# Patient Record
Sex: FEMALE | Race: BLACK OR AFRICAN AMERICAN | NOT HISPANIC OR LATINO | Employment: OTHER | URBAN - METROPOLITAN AREA
[De-identification: names, ages, dates, MRNs, and addresses within clinical notes are randomized per-mention and may not be internally consistent; named-entity substitution may affect disease eponyms.]

---

## 2023-10-24 ENCOUNTER — HOSPITAL ENCOUNTER (OUTPATIENT)
Facility: HOSPITAL | Age: 43
Discharge: HOME | End: 2023-10-26
Attending: EMERGENCY MEDICINE | Admitting: EMERGENCY MEDICINE
Payer: MEDICAID

## 2023-10-24 DIAGNOSIS — E66.01 MORBID OBESITY: ICD-10-CM

## 2023-10-24 DIAGNOSIS — R11.2 NAUSEA AND VOMITING, UNSPECIFIED VOMITING TYPE: ICD-10-CM

## 2023-10-24 DIAGNOSIS — R10.11 RIGHT UPPER QUADRANT ABDOMINAL PAIN: ICD-10-CM

## 2023-10-24 DIAGNOSIS — I50.9 CHF (CONGESTIVE HEART FAILURE): ICD-10-CM

## 2023-10-24 DIAGNOSIS — I05.9 MITRAL VALVE DISEASE: ICD-10-CM

## 2023-10-24 DIAGNOSIS — R06.02 SOB (SHORTNESS OF BREATH): ICD-10-CM

## 2023-10-24 DIAGNOSIS — I10 PRIMARY HYPERTENSION: ICD-10-CM

## 2023-10-24 DIAGNOSIS — K80.00 CALCULUS OF GALLBLADDER WITH ACUTE CHOLECYSTITIS WITHOUT OBSTRUCTION: ICD-10-CM

## 2023-10-24 DIAGNOSIS — K80.20 MULTIPLE GALLSTONES: Primary | ICD-10-CM

## 2023-10-24 LAB
ALBUMIN SERPL BCP-MCNC: 3.8 G/DL (ref 3.5–5.2)
ALP SERPL-CCNC: 73 U/L (ref 55–135)
ALT SERPL W/O P-5'-P-CCNC: 12 U/L (ref 10–44)
ANION GAP SERPL CALC-SCNC: 11 MMOL/L (ref 8–16)
AST SERPL-CCNC: 13 U/L (ref 10–40)
B-HCG UR QL: NEGATIVE
BACTERIA #/AREA URNS HPF: NORMAL /HPF
BASOPHILS # BLD AUTO: 0.05 K/UL (ref 0–0.2)
BASOPHILS NFR BLD: 0.5 % (ref 0–1.9)
BILIRUB SERPL-MCNC: 0.3 MG/DL (ref 0.1–1)
BILIRUB UR QL STRIP: NEGATIVE
BUN SERPL-MCNC: 8 MG/DL (ref 6–20)
CALCIUM SERPL-MCNC: 8.8 MG/DL (ref 8.7–10.5)
CHLORIDE SERPL-SCNC: 104 MMOL/L (ref 95–110)
CLARITY UR: ABNORMAL
CO2 SERPL-SCNC: 22 MMOL/L (ref 23–29)
COLOR UR: YELLOW
CREAT SERPL-MCNC: 1.2 MG/DL (ref 0.5–1.4)
DIFFERENTIAL METHOD: ABNORMAL
EOSINOPHIL # BLD AUTO: 0.1 K/UL (ref 0–0.5)
EOSINOPHIL NFR BLD: 1.2 % (ref 0–8)
ERYTHROCYTE [DISTWIDTH] IN BLOOD BY AUTOMATED COUNT: 19.8 % (ref 11.5–14.5)
EST. GFR  (NO RACE VARIABLE): 57 ML/MIN/1.73 M^2
GLUCOSE SERPL-MCNC: 125 MG/DL (ref 70–110)
GLUCOSE UR QL STRIP: NEGATIVE
HCT VFR BLD AUTO: 37.7 % (ref 37–48.5)
HGB BLD-MCNC: 11 G/DL (ref 12–16)
HGB UR QL STRIP: NEGATIVE
HYALINE CASTS #/AREA URNS LPF: 1 /LPF
IMM GRANULOCYTES # BLD AUTO: 0.05 K/UL (ref 0–0.04)
IMM GRANULOCYTES NFR BLD AUTO: 0.5 % (ref 0–0.5)
KETONES UR QL STRIP: NEGATIVE
LEUKOCYTE ESTERASE UR QL STRIP: NEGATIVE
LIPASE SERPL-CCNC: 14 U/L (ref 4–60)
LYMPHOCYTES # BLD AUTO: 1.9 K/UL (ref 1–4.8)
LYMPHOCYTES NFR BLD: 17.2 % (ref 18–48)
MCH RBC QN AUTO: 21.7 PG (ref 27–31)
MCHC RBC AUTO-ENTMCNC: 29.2 G/DL (ref 32–36)
MCV RBC AUTO: 75 FL (ref 82–98)
MICROSCOPIC COMMENT: NORMAL
MONOCYTES # BLD AUTO: 0.5 K/UL (ref 0.3–1)
MONOCYTES NFR BLD: 4.9 % (ref 4–15)
NEUTROPHILS # BLD AUTO: 8.3 K/UL (ref 1.8–7.7)
NEUTROPHILS NFR BLD: 75.7 % (ref 38–73)
NITRITE UR QL STRIP: NEGATIVE
NRBC BLD-RTO: 0 /100 WBC
PH UR STRIP: 7 [PH] (ref 5–8)
PLATELET # BLD AUTO: 374 K/UL (ref 150–450)
PMV BLD AUTO: 9.1 FL (ref 9.2–12.9)
POCT GLUCOSE: 103 MG/DL (ref 70–110)
POCT GLUCOSE: 127 MG/DL (ref 70–110)
POTASSIUM SERPL-SCNC: 4.6 MMOL/L (ref 3.5–5.1)
PROT SERPL-MCNC: 8.8 G/DL (ref 6–8.4)
PROT UR QL STRIP: ABNORMAL
RBC # BLD AUTO: 5.06 M/UL (ref 4–5.4)
RBC #/AREA URNS HPF: 1 /HPF (ref 0–4)
SODIUM SERPL-SCNC: 137 MMOL/L (ref 136–145)
SP GR UR STRIP: 1.02 (ref 1–1.03)
SQUAMOUS #/AREA URNS HPF: 24 /HPF
URN SPEC COLLECT METH UR: ABNORMAL
UROBILINOGEN UR STRIP-ACNC: NEGATIVE EU/DL
WBC # BLD AUTO: 10.98 K/UL (ref 3.9–12.7)
WBC #/AREA URNS HPF: 2 /HPF (ref 0–5)

## 2023-10-24 PROCEDURE — 80053 COMPREHEN METABOLIC PANEL: CPT | Performed by: NURSE PRACTITIONER

## 2023-10-24 PROCEDURE — 63600175 PHARM REV CODE 636 W HCPCS: Performed by: EMERGENCY MEDICINE

## 2023-10-24 PROCEDURE — 99285 EMERGENCY DEPT VISIT HI MDM: CPT | Mod: 25

## 2023-10-24 PROCEDURE — 83690 ASSAY OF LIPASE: CPT | Performed by: NURSE PRACTITIONER

## 2023-10-24 PROCEDURE — 90677 PCV20 VACCINE IM: CPT | Performed by: EMERGENCY MEDICINE

## 2023-10-24 PROCEDURE — 81000 URINALYSIS NONAUTO W/SCOPE: CPT | Performed by: NURSE PRACTITIONER

## 2023-10-24 PROCEDURE — 99204 OFFICE O/P NEW MOD 45 MIN: CPT | Mod: ,,, | Performed by: STUDENT IN AN ORGANIZED HEALTH CARE EDUCATION/TRAINING PROGRAM

## 2023-10-24 PROCEDURE — 25000003 PHARM REV CODE 250: Performed by: NURSE PRACTITIONER

## 2023-10-24 PROCEDURE — G0378 HOSPITAL OBSERVATION PER HR: HCPCS

## 2023-10-24 PROCEDURE — 25000003 PHARM REV CODE 250: Performed by: EMERGENCY MEDICINE

## 2023-10-24 PROCEDURE — 81025 URINE PREGNANCY TEST: CPT | Performed by: NURSE PRACTITIONER

## 2023-10-24 PROCEDURE — 96376 TX/PRO/DX INJ SAME DRUG ADON: CPT

## 2023-10-24 PROCEDURE — 96365 THER/PROPH/DIAG IV INF INIT: CPT | Mod: 59

## 2023-10-24 PROCEDURE — 90686 IIV4 VACC NO PRSV 0.5 ML IM: CPT | Performed by: EMERGENCY MEDICINE

## 2023-10-24 PROCEDURE — 85025 COMPLETE CBC W/AUTO DIFF WBC: CPT | Performed by: NURSE PRACTITIONER

## 2023-10-24 PROCEDURE — 90472 IMMUNIZATION ADMIN EACH ADD: CPT | Performed by: EMERGENCY MEDICINE

## 2023-10-24 PROCEDURE — 90471 IMMUNIZATION ADMIN: CPT | Performed by: EMERGENCY MEDICINE

## 2023-10-24 PROCEDURE — 96376 TX/PRO/DX INJ SAME DRUG ADON: CPT | Mod: 59

## 2023-10-24 PROCEDURE — 96361 HYDRATE IV INFUSION ADD-ON: CPT

## 2023-10-24 PROCEDURE — 99204 PR OFFICE/OUTPT VISIT, NEW, LEVL IV, 45-59 MIN: ICD-10-PCS | Mod: ,,, | Performed by: STUDENT IN AN ORGANIZED HEALTH CARE EDUCATION/TRAINING PROGRAM

## 2023-10-24 PROCEDURE — 63600175 PHARM REV CODE 636 W HCPCS: Performed by: STUDENT IN AN ORGANIZED HEALTH CARE EDUCATION/TRAINING PROGRAM

## 2023-10-24 PROCEDURE — 25000003 PHARM REV CODE 250: Performed by: STUDENT IN AN ORGANIZED HEALTH CARE EDUCATION/TRAINING PROGRAM

## 2023-10-24 PROCEDURE — 96372 THER/PROPH/DIAG INJ SC/IM: CPT | Mod: 59 | Performed by: STUDENT IN AN ORGANIZED HEALTH CARE EDUCATION/TRAINING PROGRAM

## 2023-10-24 PROCEDURE — 96375 TX/PRO/DX INJ NEW DRUG ADDON: CPT | Mod: 59

## 2023-10-24 RX ORDER — ONDANSETRON 2 MG/ML
4 INJECTION INTRAMUSCULAR; INTRAVENOUS EVERY 8 HOURS PRN
Status: DISCONTINUED | OUTPATIENT
Start: 2023-10-24 | End: 2023-10-26 | Stop reason: HOSPADM

## 2023-10-24 RX ORDER — PROMETHAZINE HYDROCHLORIDE 25 MG/1
25 TABLET ORAL EVERY 6 HOURS PRN
Status: DISCONTINUED | OUTPATIENT
Start: 2023-10-24 | End: 2023-10-26 | Stop reason: HOSPADM

## 2023-10-24 RX ORDER — CARVEDILOL 12.5 MG/1
25 TABLET ORAL 2 TIMES DAILY
Status: DISCONTINUED | OUTPATIENT
Start: 2023-10-24 | End: 2023-10-26 | Stop reason: HOSPADM

## 2023-10-24 RX ORDER — ENOXAPARIN SODIUM 100 MG/ML
40 INJECTION SUBCUTANEOUS EVERY 24 HOURS
Status: DISCONTINUED | OUTPATIENT
Start: 2023-10-24 | End: 2023-10-24

## 2023-10-24 RX ORDER — HYDROMORPHONE HYDROCHLORIDE 2 MG/ML
1 INJECTION, SOLUTION INTRAMUSCULAR; INTRAVENOUS; SUBCUTANEOUS
Status: COMPLETED | OUTPATIENT
Start: 2023-10-24 | End: 2023-10-24

## 2023-10-24 RX ORDER — ACETAMINOPHEN 325 MG/1
650 TABLET ORAL EVERY 4 HOURS PRN
Status: DISCONTINUED | OUTPATIENT
Start: 2023-10-24 | End: 2023-10-26 | Stop reason: HOSPADM

## 2023-10-24 RX ORDER — INSULIN ASPART 100 [IU]/ML
0-5 INJECTION, SOLUTION INTRAVENOUS; SUBCUTANEOUS
Status: DISCONTINUED | OUTPATIENT
Start: 2023-10-24 | End: 2023-10-26 | Stop reason: HOSPADM

## 2023-10-24 RX ORDER — SODIUM CHLORIDE 0.9 % (FLUSH) 0.9 %
10 SYRINGE (ML) INJECTION EVERY 12 HOURS PRN
Status: DISCONTINUED | OUTPATIENT
Start: 2023-10-24 | End: 2023-10-26 | Stop reason: HOSPADM

## 2023-10-24 RX ORDER — HYDROMORPHONE HYDROCHLORIDE 1 MG/ML
0.2 INJECTION, SOLUTION INTRAMUSCULAR; INTRAVENOUS; SUBCUTANEOUS EVERY 6 HOURS PRN
Status: DISCONTINUED | OUTPATIENT
Start: 2023-10-24 | End: 2023-10-26 | Stop reason: HOSPADM

## 2023-10-24 RX ORDER — GLUCAGON 1 MG
1 KIT INJECTION
Status: DISCONTINUED | OUTPATIENT
Start: 2023-10-24 | End: 2023-10-26 | Stop reason: HOSPADM

## 2023-10-24 RX ORDER — ASPIRIN 81 MG/1
1 TABLET ORAL DAILY
COMMUNITY

## 2023-10-24 RX ORDER — FUROSEMIDE 40 MG/1
40 TABLET ORAL DAILY
Status: DISCONTINUED | OUTPATIENT
Start: 2023-10-25 | End: 2023-10-26 | Stop reason: HOSPADM

## 2023-10-24 RX ORDER — SACUBITRIL AND VALSARTAN 49; 51 MG/1; MG/1
1 TABLET, FILM COATED ORAL 2 TIMES DAILY
COMMUNITY
Start: 2023-09-28

## 2023-10-24 RX ORDER — MORPHINE SULFATE 4 MG/ML
4 INJECTION, SOLUTION INTRAMUSCULAR; INTRAVENOUS
Status: COMPLETED | OUTPATIENT
Start: 2023-10-24 | End: 2023-10-24

## 2023-10-24 RX ORDER — ONDANSETRON 2 MG/ML
4 INJECTION INTRAMUSCULAR; INTRAVENOUS
Status: COMPLETED | OUTPATIENT
Start: 2023-10-24 | End: 2023-10-24

## 2023-10-24 RX ORDER — IBUPROFEN 200 MG
24 TABLET ORAL
Status: DISCONTINUED | OUTPATIENT
Start: 2023-10-24 | End: 2023-10-26 | Stop reason: HOSPADM

## 2023-10-24 RX ORDER — HYDROMORPHONE HYDROCHLORIDE 2 MG/ML
0.2 INJECTION, SOLUTION INTRAMUSCULAR; INTRAVENOUS; SUBCUTANEOUS EVERY 6 HOURS PRN
Status: DISCONTINUED | OUTPATIENT
Start: 2023-10-24 | End: 2023-10-24 | Stop reason: CLARIF

## 2023-10-24 RX ORDER — NALOXONE HCL 0.4 MG/ML
0.02 VIAL (ML) INJECTION
Status: DISCONTINUED | OUTPATIENT
Start: 2023-10-24 | End: 2023-10-26 | Stop reason: HOSPADM

## 2023-10-24 RX ORDER — IBUPROFEN 200 MG
16 TABLET ORAL
Status: DISCONTINUED | OUTPATIENT
Start: 2023-10-24 | End: 2023-10-26 | Stop reason: HOSPADM

## 2023-10-24 RX ORDER — OXYCODONE HYDROCHLORIDE 5 MG/1
5 TABLET ORAL EVERY 6 HOURS PRN
Status: DISCONTINUED | OUTPATIENT
Start: 2023-10-24 | End: 2023-10-25

## 2023-10-24 RX ORDER — SODIUM CHLORIDE 9 MG/ML
INJECTION, SOLUTION INTRAVENOUS
Status: DISCONTINUED | OUTPATIENT
Start: 2023-10-24 | End: 2023-10-25

## 2023-10-24 RX ORDER — ENOXAPARIN SODIUM 100 MG/ML
60 INJECTION SUBCUTANEOUS EVERY 12 HOURS
Status: DISCONTINUED | OUTPATIENT
Start: 2023-10-24 | End: 2023-10-25

## 2023-10-24 RX ADMIN — HYDROMORPHONE HYDROCHLORIDE 1 MG: 2 INJECTION, SOLUTION INTRAMUSCULAR; INTRAVENOUS; SUBCUTANEOUS at 02:10

## 2023-10-24 RX ADMIN — HYDROMORPHONE HYDROCHLORIDE 0.2 MG: 1 INJECTION, SOLUTION INTRAMUSCULAR; INTRAVENOUS; SUBCUTANEOUS at 08:10

## 2023-10-24 RX ADMIN — ONDANSETRON 4 MG: 2 INJECTION INTRAMUSCULAR; INTRAVENOUS at 06:10

## 2023-10-24 RX ADMIN — SODIUM CHLORIDE: 9 INJECTION, SOLUTION INTRAVENOUS at 04:10

## 2023-10-24 RX ADMIN — OXYCODONE HYDROCHLORIDE 5 MG: 5 TABLET ORAL at 06:10

## 2023-10-24 RX ADMIN — MORPHINE SULFATE 4 MG: 4 INJECTION INTRAVENOUS at 12:10

## 2023-10-24 RX ADMIN — INFLUENZA VIRUS VACCINE 0.5 ML: 15; 15; 15; 15 SUSPENSION INTRAMUSCULAR at 04:10

## 2023-10-24 RX ADMIN — ONDANSETRON 4 MG: 2 INJECTION INTRAMUSCULAR; INTRAVENOUS at 12:10

## 2023-10-24 RX ADMIN — PIPERACILLIN SODIUM AND TAZOBACTAM SODIUM 4.5 G: 4; .5 INJECTION, POWDER, FOR SOLUTION INTRAVENOUS at 04:10

## 2023-10-24 RX ADMIN — CARVEDILOL 25 MG: 12.5 TABLET, FILM COATED ORAL at 08:10

## 2023-10-24 RX ADMIN — ENOXAPARIN SODIUM 60 MG: 60 INJECTION SUBCUTANEOUS at 08:10

## 2023-10-24 RX ADMIN — PNEUMOCOCCAL 20-VALENT CONJUGATE VACCINE 0.5 ML
2.2; 2.2; 2.2; 2.2; 2.2; 2.2; 2.2; 2.2; 2.2; 2.2; 2.2; 2.2; 2.2; 2.2; 2.2; 2.2; 4.4; 2.2; 2.2; 2.2 INJECTION, SUSPENSION INTRAMUSCULAR at 04:10

## 2023-10-24 RX ADMIN — SACUBITRIL AND VALSARTAN 1 TABLET: 49; 51 TABLET, FILM COATED ORAL at 08:10

## 2023-10-24 RX ADMIN — HYDROMORPHONE HYDROCHLORIDE 1 MG: 2 INJECTION, SOLUTION INTRAMUSCULAR; INTRAVENOUS; SUBCUTANEOUS at 01:10

## 2023-10-24 NOTE — ASSESSMENT & PLAN NOTE
-patient says she has a mitral valve tear, she takes Entresto and was told that it would repair it medically  -f/u OP

## 2023-10-24 NOTE — ASSESSMENT & PLAN NOTE
"Patient is identified as having Systolic (HFrEF) heart failure that is Chronic. CHF is currently controlled. Latest ECHO performed and demonstrates- No results found for this or any previous visit.  . Continue ACE/ARB and Furosemide and monitor clinical status closely. Monitor on telemetry. Patient is off CHF pathway.  Monitor strict Is&Os and daily weights.  Place on fluid restriction of 1.5 L. Cardiology has been consulted. Continue to stress to patient importance of self efficacy and  on diet for CHF. Last BNP reviewed- and noted below No results for input(s): "BNP", "BNPTRIAGEBLO" in the last 168 hours.  "

## 2023-10-24 NOTE — PHARMACY MED REC
"Admission Medication History     The home medication history was taken by Elina Holcomb.    You may go to "Admission" then "Reconcile Home Medications" tabs to review and/or act upon these items.     The home medication list has been updated by the Pharmacy department.   Please read ALL comments highlighted in yellow.   Please address this information as you see fit.    Feel free to contact us if you have any questions or require assistance.      The medications listed below were removed from the home medication list. Please reorder if appropriate:  Patient reports no longer taking the following medication(s):  Prinivil 20mg  Phenergan 12.5mg      Medications listed below were obtained from: Patient/family and Analytic software- Antonio,family member in room helped with med list  (Not in a hospital admission)        Elina Holcomb  [id number]      Current Outpatient Medications on File Prior to Encounter   Medication Sig Dispense Refill Last Dose    aspirin (ECOTRIN) 81 MG EC tablet Take 1 tablet by mouth once daily.   10/24/2023    atorvastatin (LIPITOR) 20 MG tablet TK 1 T PO ONCE A DAY AT NIGHT  11 10/24/2023    carvedilol (COREG) 25 MG tablet TK 1 T PO  BID  11 10/24/2023    ENTRESTO 49-51 mg per tablet Take 1 tablet by mouth 2 (two) times daily.   10/24/2023    furosemide (LASIX) 40 MG tablet TK 1 T PO QD  8 10/24/2023                           .          "

## 2023-10-24 NOTE — ASSESSMENT & PLAN NOTE
Chronic, controlled  -Latest blood pressure and vitals reviewed-   Temp:  [97.9 °F (36.6 °C)-98.4 °F (36.9 °C)]   Pulse:  [72-77]   Resp:  [15-20]   BP: (134-165)/(62-82)   SpO2:  [97 %-100 %] .   -Home meds for hypertension were reviewed and noted below.   Hypertension Medications             carvedilol (COREG) 25 MG tablet TK 1 T PO  BID    ENTRESTO 49-51 mg per tablet Take 1 tablet by mouth 2 (two) times daily.    furosemide (LASIX) 40 MG tablet TK 1 T PO QD        -While in the hospital, will manage blood pressure as follows; Continue home antihypertensive regimen  -optimize pain management

## 2023-10-24 NOTE — HPI
44 y/o female with PMH of HTN, gallstones, CHF, mitral valve tear, and morbid obesity presented to ER 10/24/23 with c/o RUQ pain that radiates across her back on the right side with associated nausea and vomiting. She reports she has been having pain and gallstones since 2011, she has seen surgeon before and was told it wasn't bad enough for her to have it removed. She reports having attacks approximately 2 times a year and describes it as debilitating for her. She reports she does not eat fried, fatty, or red meat often; however, she had a burger last night that likely triggered her attack.  In ER, labs: H&H 11.0/37.7, CO 2 22, glucose 125. UA with +2 protein, preg test negative. CXR no acute finding. US abdomen with multiple gallstones, no gallbladder wall thickening or tenderness. Common bile duct is borderline prominent measuring 6.1 mm. Afebrile on admission. General surgery consulted and [place] [facility] [place] for cholecystitis.

## 2023-10-24 NOTE — ASSESSMENT & PLAN NOTE
-US shows multiple gallstones, no gallbladder wall thickening or tenderness, common bile duct is borderline prominent measuring 6.1 mm.   -general surgery consulted, plan for robotic oj tomorrow  -cont Zosyn IV  -IVF hydration  -start full liquid diet, NPO after midnight  -lipase 14  -pain control with IV/PO regimen  -anti-emetics PRN

## 2023-10-24 NOTE — ED PROVIDER NOTES
SCRIBE #1 NOTE: I, Marisol Pichardo, am scribing for, and in the presence of, Dulce Maria Morocho MD. I have scribed the entire note.       History     Chief Complaint   Patient presents with    Abdominal Pain     RUQ abdominal pain since last night with n/v heart burn. Pt states throwing up gold colored emesis. Pt states hx of gallbladder issues.      Review of patient's allergies indicates:  No Known Allergies      History of Present Illness     HPI    10/24/2023, 12:31 PM  History obtained from the patient      History of Present Illness: Vanessa Trammell is a 43 y.o. female patient with a PMHx of HTN, CHF, cholecystitis, torn mitral valve and is currently on lasiks who presents to the Emergency Department for evaluation of RUQ abdominal pain. Pt states that her entire right side hurts including her lower right back and top back of right shoulder. She states that the pain began around 10:30 last night. Pt has a history of gallstones and states that the pain is in similar locations than it was when she had them, but it feels much worse this time. Pt states that the last time imaging was done on her gallbladder was about a year ago. She also states that she followed up with a surgeon at [facility] in regards to the issue, but did not need surgery. Symptoms are constant and moderate in severity. Pain is exacerbated by movement and relived a little by applying pressure to affected areas. Pt reports that she has vomited bile 3 times and that the vomit has been gold in color. Other associated sxs include fevers and chills. Patient denies any diaphoresis, diarrhea, SOB, CP, numbness and all other sxs at this time. No prior Tx mentioned. No further complaints or concerns at this time.       Arrival mode: Personal vehicle     PCP: Miles Pritchard MD        Past Medical History:  Past Medical History:   Diagnosis Date    CHF (congestive heart failure)     Gallstones     Hypertension        Past Surgical History:  Past Surgical  History:   Procedure Laterality Date     SECTION      TUBAL LIGATION           Family History:  No family history on file.    Social History:  Social History     Tobacco Use    Smoking status: Every Day    Smokeless tobacco: Not on file   Substance and Sexual Activity    Alcohol use: No    Drug use: No    Sexual activity: Not on file        Review of Systems     Review of Systems   Constitutional:  Positive for chills and fever. Negative for diaphoresis.   HENT:  Negative for sore throat.    Respiratory:  Negative for shortness of breath.    Cardiovascular:  Negative for chest pain.   Gastrointestinal:  Positive for abdominal pain (RUQ), nausea and vomiting (vomiting bile). Negative for diarrhea.   Genitourinary:  Negative for dysuria.   Musculoskeletal:  Positive for arthralgias (back of top right shoulder), back pain (lower right back) and myalgias (right side of body).   Skin:  Negative for rash.   Neurological:  Negative for weakness and numbness.   Hematological:  Does not bruise/bleed easily.   All other systems reviewed and are negative.     Physical Exam     Initial Vitals [10/24/23 1110]   BP Pulse Resp Temp SpO2   (!) 146/63 77 20 98.4 °F (36.9 °C) 98 %      MAP       --          Physical Exam  Nursing Notes and Vital Signs Reviewed.  Constitutional: Patient is in no acute distress. Obese.  Head: Atraumatic. Normocephalic.  Eyes: PERRL. EOM intact. Conjunctivae are not pale. No scleral icterus.  ENT: Mucous membranes are moist. Oropharynx is clear and symmetric.    Neck: Supple. Full ROM. No lymphadenopathy.  Cardiovascular: Regular rate. Regular rhythm. No murmurs, rubs, or gallops. Distal pulses are 2+ and symmetric.  Pulmonary/Chest: No respiratory distress. Clear to auscultation bilaterally. No wheezing or rales.  Abdominal: Soft and non-distended.  There is RUQ tenderness.  No rebound, guarding, or rigidity.   Musculoskeletal: Moves all extremities. No obvious deformities. No edema.  Skin:  "Warm and dry.  Neurological:  Alert, awake, and appropriate.  Normal speech.  No acute focal neurological deficits are appreciated.  Psychiatric: Normal affect. Good eye contact. Appropriate in content.     ED Course   Procedures  ED Vital Signs:  Vitals:    10/24/23 1110 10/24/23 1132 10/24/23 1133 10/24/23 1208   BP: (!) 146/63 135/62     Pulse: 77 76 75    Resp: 20 17  18   Temp: 98.4 °F (36.9 °C)      TempSrc: Oral      SpO2: 98% 100%     Weight: (!) 141.4 kg (311 lb 13.5 oz)      Height: 5' 3" (1.6 m)       10/24/23 1238 10/24/23 1302 10/24/23 1324 10/24/23 1332   BP: 134/76 (!) 164/75 (!) 145/65 (!) 145/66   Pulse: 77 73 75 74   Resp: 16 20 15 16   Temp: 97.9 °F (36.6 °C)      TempSrc:       SpO2:  99% 100% 97%   Weight:       Height:        10/24/23 1355 10/24/23 1429   BP: (!) 165/67    Pulse: 72    Resp: 15 16   Temp: 98.3 °F (36.8 °C)    TempSrc: Oral    SpO2: 100%    Weight:     Height:         Abnormal Lab Results:  Labs Reviewed   CBC W/ AUTO DIFFERENTIAL - Abnormal; Notable for the following components:       Result Value    Hemoglobin 11.0 (*)     MCV 75 (*)     MCH 21.7 (*)     MCHC 29.2 (*)     RDW 19.8 (*)     MPV 9.1 (*)     Gran # (ANC) 8.3 (*)     Immature Grans (Abs) 0.05 (*)     Gran % 75.7 (*)     Lymph % 17.2 (*)     All other components within normal limits   COMPREHENSIVE METABOLIC PANEL - Abnormal; Notable for the following components:    CO2 22 (*)     Glucose 125 (*)     Total Protein 8.8 (*)     eGFR 57 (*)     All other components within normal limits   URINALYSIS, REFLEX TO URINE CULTURE - Abnormal; Notable for the following components:    Appearance, UA Hazy (*)     Protein, UA 2+ (*)     All other components within normal limits    Narrative:     Specimen Source->Urine   LIPASE   PREGNANCY TEST, URINE RAPID    Narrative:     Specimen Source->Urine   URINALYSIS MICROSCOPIC    Narrative:     Specimen Source->Urine        All Lab Results:  Results for orders placed or performed during " the hospital encounter of 10/24/23   CBC auto differential   Result Value Ref Range    WBC 10.98 3.90 - 12.70 K/uL    RBC 5.06 4.00 - 5.40 M/uL    Hemoglobin 11.0 (L) 12.0 - 16.0 g/dL    Hematocrit 37.7 37.0 - 48.5 %    MCV 75 (L) 82 - 98 fL    MCH 21.7 (L) 27.0 - 31.0 pg    MCHC 29.2 (L) 32.0 - 36.0 g/dL    RDW 19.8 (H) 11.5 - 14.5 %    Platelets 374 150 - 450 K/uL    MPV 9.1 (L) 9.2 - 12.9 fL    Immature Granulocytes 0.5 0.0 - 0.5 %    Gran # (ANC) 8.3 (H) 1.8 - 7.7 K/uL    Immature Grans (Abs) 0.05 (H) 0.00 - 0.04 K/uL    Lymph # 1.9 1.0 - 4.8 K/uL    Mono # 0.5 0.3 - 1.0 K/uL    Eos # 0.1 0.0 - 0.5 K/uL    Baso # 0.05 0.00 - 0.20 K/uL    nRBC 0 0 /100 WBC    Gran % 75.7 (H) 38.0 - 73.0 %    Lymph % 17.2 (L) 18.0 - 48.0 %    Mono % 4.9 4.0 - 15.0 %    Eosinophil % 1.2 0.0 - 8.0 %    Basophil % 0.5 0.0 - 1.9 %    Differential Method Automated    Comprehensive metabolic panel   Result Value Ref Range    Sodium 137 136 - 145 mmol/L    Potassium 4.6 3.5 - 5.1 mmol/L    Chloride 104 95 - 110 mmol/L    CO2 22 (L) 23 - 29 mmol/L    Glucose 125 (H) 70 - 110 mg/dL    BUN 8 6 - 20 mg/dL    Creatinine 1.2 0.5 - 1.4 mg/dL    Calcium 8.8 8.7 - 10.5 mg/dL    Total Protein 8.8 (H) 6.0 - 8.4 g/dL    Albumin 3.8 3.5 - 5.2 g/dL    Total Bilirubin 0.3 0.1 - 1.0 mg/dL    Alkaline Phosphatase 73 55 - 135 U/L    AST 13 10 - 40 U/L    ALT 12 10 - 44 U/L    eGFR 57 (A) >60 mL/min/1.73 m^2    Anion Gap 11 8 - 16 mmol/L   Lipase   Result Value Ref Range    Lipase 14 4 - 60 U/L   Urinalysis, Reflex to Urine Culture Urine, Clean Catch    Specimen: Urine   Result Value Ref Range    Specimen UA Urine, Clean Catch     Color, UA Yellow Yellow, Straw, Lupe    Appearance, UA Hazy (A) Clear    pH, UA 7.0 5.0 - 8.0    Specific Gravity, UA 1.020 1.005 - 1.030    Protein, UA 2+ (A) Negative    Glucose, UA Negative Negative    Ketones, UA Negative Negative    Bilirubin (UA) Negative Negative    Occult Blood UA Negative Negative    Nitrite, UA  Negative Negative    Urobilinogen, UA Negative <2.0 EU/dL    Leukocytes, UA Negative Negative   Pregnancy, urine rapid (UPT)   Result Value Ref Range    Preg Test, Ur Negative    Urinalysis Microscopic   Result Value Ref Range    RBC, UA 1 0 - 4 /hpf    WBC, UA 2 0 - 5 /hpf    Bacteria Rare None-Occ /hpf    Squam Epithel, UA 24 /hpf    Hyaline Casts, UA 1 0-1/lpf /lpf    Microscopic Comment SEE COMMENT         Imaging Results:  Imaging Results              US Abdomen Limited (Gallbladder) (Final result)  Result time 10/24/23 12:59:37      Final result by Amandeep Rothman MD (Timothy) (10/24/23 12:59:37)                   Impression:      Multiple gallstones.  No gallbladder wall thickening or tenderness.  Common bile duct is borderline prominent measuring 6.1 mm.  Correlation with liver function enzymes recommended.  Consider MRCP if bile duct obstruction is suspected.      Electronically signed by: Amandeep Rothman MD  Date:    10/24/2023  Time:    12:59               Narrative:    EXAMINATION:  US ABDOMEN LIMITED    CLINICAL HISTORY:  Right upper quadrant pain    COMPARISON:  None    FINDINGS:  The liver is appears enlarged measuring 18 cm.  No liver masses.  Multiple small gallstones.  Gallstones measure up to 7 mm.  No gallbladder wall thickening or tenderness.  Common bile duct is borderline prominent measuring 6.1 mm.  No intrahepatic bile duct dilatation    The portal vein is grossly normal.  The pancreas is normal.  The right kidney is normal at 10.8 cm.    No ascites.  The aorta and IVC are normal.  The adrenals are not included.                                       X-Ray Chest AP Portable (Final result)  Result time 10/24/23 12:27:14      Final result by Amandeep Rothman MD (Timothy) (10/24/23 12:27:14)                   Impression:      Clear lungs.      Electronically signed by: Amandeep Rothman MD  Date:    10/24/2023  Time:    12:27               Narrative:    EXAMINATION:  XR CHEST AP  PORTABLE    CLINICAL HISTORY:  , Chest pain;    COMPARISON:  Chest, 02/11/2016.    FINDINGS:  Heart size is normal. The lung fields are clear. No acute cardiopulmonary infiltrate.                                                The Emergency Provider reviewed the vital signs and test results, which are outlined above.     ED Discussion     2:25 PM: Discussed pt's case with Dr. Krishna (General Surgery) who will see the pt in the ER.    2:50 PM: Discussed pt's case with Dr. Krishna (General Surgery) who recommends abx and admission to Hospital Medicine for clearance for CHF before surgery tomorrow.    3:03 PM: Discussed case with Dr. Bergman ([facility]). Dr. Bergman agrees with current care and management of pt and accepts admission.   Admitting Service: [facility]  Admitting Physician: Dr. Bergman   Admit to: Obs    3:04 PM: Re-evaluated pt. I have discussed test results, shared treatment plan, and the need for admission with patient and family at bedside. Pt and family express understanding at this time and agree with all information. All questions answered. Pt and family have no further questions or concerns at this time. Pt is ready for admit.         Medical Decision Making   History of Present Illness: Vanessa Trammell is a 43 y.o. female patient with a PMHx of HTN, CHF, cholecystitis, torn mitral valve and is currently on lasiks who presents to the Emergency Department for evaluation of RUQ abdominal pain. Pt states that her entire right side hurts including her lower right back and top back of right shoulder. She states that the pain began around 10:30 last night. Pt has a history of gallstones and states that the pain is in similar locations than it was when she had them, but it feels much worse this time. Pt states that the last time imaging was done on her gallbladder was about a year ago.     Amount and/or Complexity of Data Reviewed  Labs: ordered. Decision-making details documented in ED  Course.  Radiology: ordered. Decision-making details documented in ED Course.  Discussion of management or test interpretation with external provider(s): Patient with gallstones diagnosed about 2 years ago here with pain to right upper quadrant multiple gallstones gallbladder liver enzymes normal white blood cell count normal but she continues to have pain after morphine Dilaudid.  She would like a surgical consult to see she is a candidate for surgery she does not feel like she can take the pain anymore.     Normal CBC. CMP shows normal liver enzymes and total bilirubin. US shows multiple gallstones and borderline prominent common bile duct at 6.1 mm. Will give morphine and zofran in ER for pain.    2:10 PM: Pt still complains of 5/10 pain after morphine and dilaudid. She is requesting a surgical consult for cholecystectomy.    2:25 PM: Discussed pt's case with Dr. Krishna (General Surgery) who will see the pt in the ER.    2:50 PM: Discussed pt's case with Dr. Krishna (General Surgery) who recommends abx and admission to Hospital Medicine for clearance for CHF before surgery tomorrow.    3:03 PM: Discussed case with Dr. Bergman (Hospital Medicine). Dr. Bergman agrees with current care and management of pt and accepts admission.   Admitting Service: Hospital Medicine  Admitting Physician: Dr. Bergman  Admit to: Obs    Risk  Prescription drug management.  Parenteral controlled substances.  Decision regarding hospitalization.  Risk Details: Differential diagnosis;  Gastroenteritis, Bowel obstruction, Colitis, Diverticulitis, Cholecystitis, Appendicitis, Perforated bowel, Herniation, Infectious etiology, UTI, Pyelonephritis, Inferior cardiac infarct, Biliary obstruction, kidney stone                   ED Medication(s):  Medications   piperacillin-tazobactam (ZOSYN) 4.5 g in dextrose 5 % in water (D5W) 100 mL IVPB (MB+) (has no administration in time range)   ondansetron injection 4 mg (4 mg Intravenous Given 10/24/23  1208)   morphine injection 4 mg (4 mg Intravenous Given 10/24/23 1208)   HYDROmorphone (PF) injection 1 mg (1 mg Intravenous Given 10/24/23 1324)   HYDROmorphone (PF) injection 1 mg (1 mg Intravenous Given 10/24/23 1429)       New Prescriptions    No medications on file               Scribe Attestation:   Scribe #1: I performed the above scribed service and the documentation accurately describes the services I performed. I attest to the accuracy of the note.     Attending:   Physician Attestation Statement for Scribe #1: I, Dulce Maria Morocho MD, personally performed the services described in this documentation, as scribed by Marisol Pichardo, in my presence, and it is both accurate and complete.           Clinical Impression       ICD-10-CM ICD-9-CM   1. Multiple gallstones  K80.20 574.20   2. Right upper quadrant abdominal pain  R10.11 789.01   3. Calculus of gallbladder with acute cholecystitis without obstruction  K80.00 574.00       Disposition:   Disposition: Placed in Observation  Condition: Fair        Dulce Maria Morocho MD  10/24/23 7298

## 2023-10-24 NOTE — SUBJECTIVE & OBJECTIVE
Past Medical History:   Diagnosis Date    CHF (congestive heart failure)     Gallstones     Hypertension     Mitral valve disease     Morbid obesity        Past Surgical History:   Procedure Laterality Date     SECTION      TUBAL LIGATION         Review of patient's allergies indicates:  No Known Allergies    No current facility-administered medications on file prior to encounter.     Current Outpatient Medications on File Prior to Encounter   Medication Sig    aspirin (ECOTRIN) 81 MG EC tablet Take 1 tablet by mouth once daily.    atorvastatin (LIPITOR) 20 MG tablet TK 1 T PO ONCE A DAY AT NIGHT    carvedilol (COREG) 25 MG tablet TK 1 T PO  BID    ENTRESTO 49-51 mg per tablet Take 1 tablet by mouth 2 (two) times daily.    furosemide (LASIX) 40 MG tablet TK 1 T PO QD    [DISCONTINUED] lisinopril (PRINIVIL,ZESTRIL) 20 MG tablet Take 20 mg by mouth 2 (two) times daily.    [DISCONTINUED] promethazine (PHENERGAN) 12.5 MG Tab Take 1 tablet (12.5 mg total) by mouth every 4 (four) hours as needed.    [DISCONTINUED] oxycodone-acetaminophen (PERCOCET)  mg per tablet Take 0.5-1 tablets by mouth every 4 (four) hours as needed for Pain.     Family History       Problem Relation (Age of Onset)    Hypertension Mother          Tobacco Use    Smoking status: Every Day    Smokeless tobacco: Not on file   Substance and Sexual Activity    Alcohol use: No    Drug use: No    Sexual activity: Not on file     Review of Systems   Constitutional:  Negative for fatigue and fever.   Respiratory:  Negative for cough and shortness of breath.    Cardiovascular:  Negative for chest pain and palpitations.   Gastrointestinal:  Positive for abdominal pain, nausea and vomiting. Negative for constipation and diarrhea.   Genitourinary:  Negative for difficulty urinating.   Neurological:  Negative for weakness.   All other systems reviewed and are negative.    Objective:     Vital Signs (Most Recent):  Temp: 98.1 °F (36.7 °C) (10/24/23  1533)  Pulse: 72 (10/24/23 1539)  Resp: 18 (10/24/23 1533)  BP: 136/63 (10/24/23 1539)  SpO2: 97 % (10/24/23 1533) Vital Signs (24h Range):  Temp:  [97.9 °F (36.6 °C)-98.4 °F (36.9 °C)] 98.1 °F (36.7 °C)  Pulse:  [72-77] 72  Resp:  [15-20] 18  SpO2:  [97 %-100 %] 97 %  BP: (134-165)/(62-82) 136/63     Weight: (!) 141.1 kg (311 lb)  Body mass index is 55.09 kg/m².     Physical Exam  Vitals and nursing note reviewed.   Constitutional:       Appearance: She is obese.   HENT:      Head: Normocephalic.   Eyes:      Pupils: Pupils are equal, round, and reactive to light.   Cardiovascular:      Rate and Rhythm: Normal rate and regular rhythm.      Heart sounds: No murmur heard.  Pulmonary:      Effort: Pulmonary effort is normal.      Breath sounds: Normal breath sounds.   Abdominal:      General: Bowel sounds are normal.      Palpations: Abdomen is soft.      Tenderness: There is abdominal tenderness (RUQ radiating to right flank and back).   Musculoskeletal:         General: Normal range of motion.   Skin:     General: Skin is warm and dry.   Neurological:      General: No focal deficit present.      Mental Status: She is alert and oriented to person, place, and time.   Psychiatric:         Mood and Affect: Mood normal.         Behavior: Behavior normal.              CRANIAL NERVES     CN III, IV, VI   Pupils are equal, round, and reactive to light.       Significant Labs: All pertinent labs within the past 24 hours have been reviewed.  CBC:   Recent Labs   Lab 10/24/23  1128   WBC 10.98   HGB 11.0*   HCT 37.7        CMP:   Recent Labs   Lab 10/24/23  1128      K 4.6      CO2 22*   *   BUN 8   CREATININE 1.2   CALCIUM 8.8   PROT 8.8*   ALBUMIN 3.8   BILITOT 0.3   ALKPHOS 73   AST 13   ALT 12   ANIONGAP 11     Urine Studies:   Recent Labs   Lab 10/24/23  1325   COLORU Yellow   APPEARANCEUA Hazy*   PHUR 7.0   SPECGRAV 1.020   PROTEINUA 2+*   GLUCUA Negative   KETONESU Negative   BILIRUBINUA  Negative   OCCULTUA Negative   NITRITE Negative   UROBILINOGEN Negative   LEUKOCYTESUR Negative   RBCUA 1   WBCUA 2   BACTERIA Rare   SQUAMEPITHEL 24   HYALINECASTS 1       Significant Imaging: I have reviewed all pertinent imaging results/findings within the past 24 hours.

## 2023-10-24 NOTE — CONSULTS
Addended by: TRUPTI CONTRERAS on: 2/9/2022 04:08 PM     Modules accepted: Orders     [facility] - Emergency Dept.  General Surgery  Consult Note    Patient Name: Vanessa Trammell  MRN: [id number]  Admission Date: 10/24/2023  Hospital Length of Stay: 0 days  Attending Physician: Dulce Maria Morocho MD  Primary Care Provider: Miles Pritchard MD    Consults  Subjective:     Chief Complaint/Reason for Admission: RUQ pain    History of Present Illness: Pt is a 44yo F who presents with acute RUQ pain. Reports on and off again RUQ sharp pain radiating to her back since . This episode began 2 days ago, associated with fevers to 101.3F, chills, nausea and vomiting. Nothing has improved the pain and generally her pain is much better by now. Avoids fatty/fried food as that worsens the pain.     No current facility-administered medications for this encounter.     Current Outpatient Medications   Medication Sig    atorvastatin (LIPITOR) 20 MG tablet TK 1 T PO ONCE A DAY AT NIGHT    carvedilol (COREG) 25 MG tablet TK 1 T PO  BID    furosemide (LASIX) 40 MG tablet TK 1 T PO QD    lisinopril (PRINIVIL,ZESTRIL) 20 MG tablet Take 20 mg by mouth 2 (two) times daily.    promethazine (PHENERGAN) 12.5 MG Tab Take 1 tablet (12.5 mg total) by mouth every 4 (four) hours as needed.    oxycodone-acetaminophen (PERCOCET)  mg per tablet Take 0.5-1 tablets by mouth every 4 (four) hours as needed for Pain.       Review of patient's allergies indicates:  No Known Allergies    Past Medical History:   Diagnosis Date    CHF (congestive heart failure)     Gallstones     Hypertension      Past Surgical History:   Procedure Laterality Date     SECTION      TUBAL LIGATION       Family History    None       Tobacco Use    Smoking status: Every Day    Smokeless tobacco: Not on file   Substance and Sexual Activity    Alcohol use: No    Drug use: No    Sexual activity: Not on file     Objective:     Vital Signs (Most Recent):  Temp: 97.9 °F (36.6 °C) (10/24/23 1238)  Pulse: 74 (10/24/23 1332)  Resp: 16 (10/24/23 1429)  BP: (!) 145/66  (10/24/23 1332)  SpO2: 97 % (10/24/23 1332) Vital Signs (24h Range):  Temp:  [97.9 °F (36.6 °C)-98.4 °F (36.9 °C)] 97.9 °F (36.6 °C)  Pulse:  [73-77] 74  Resp:  [15-20] 16  SpO2:  [97 %-100 %] 97 %  BP: (134-164)/(62-76) 145/66     Weight: (!) 141.4 kg (311 lb 13.5 oz)  Body mass index is 55.24 kg/m².    Physical Exam  Constitutional:       Appearance: She is well-developed. She is obese.   HENT:      Head: Normocephalic and atraumatic.   Eyes:      Conjunctiva/sclera: Conjunctivae normal.      Pupils: Pupils are equal, round, and reactive to light.   Neck:      Thyroid: No thyromegaly.   Cardiovascular:      Rate and Rhythm: Normal rate and regular rhythm.   Pulmonary:      Effort: Pulmonary effort is normal. No respiratory distress.   Abdominal:      Comments: Obese, TTP in RUQ with positive oswald's sign   Musculoskeletal:         General: No tenderness. Normal range of motion.      Cervical back: Normal range of motion.   Skin:     General: Skin is warm and dry.      Capillary Refill: Capillary refill takes less than 2 seconds.   Neurological:      General: No focal deficit present.      Mental Status: She is alert and oriented to person, place, and time.         Significant Labs:  CBC:   Recent Labs   Lab 10/24/23  1128   WBC 10.98   RBC 5.06   HGB 11.0*   HCT 37.7      MCV 75*   MCH 21.7*   MCHC 29.2*     CMP:   Recent Labs   Lab 10/24/23  1128   *   CALCIUM 8.8   ALBUMIN 3.8   PROT 8.8*      K 4.6   CO2 22*      BUN 8   CREATININE 1.2   ALKPHOS 73   ALT 12   AST 13   BILITOT 0.3       Significant Diagnostics:  U/S: I have reviewed all pertinent results/findings within the past 24 hours:  cholelithiasis without pericholecystic fluid or GB wall thickness. CBD 6.1mm     Assessment/Plan:     There are no hospital problems to display for this patient.      44yo F with CHF and acute cholecystitis    Thank you for your consult.       - admit to [facility] [place], cardiac workup preop  - ok for PO intake  today, NPO at midnight  - zosyn  - will plan for robotic cholecystectomy tomorrow pending cardiac workup    Tawanna Krishna MD  Colorectal Surgery  [facility] - Emergency Dept.

## 2023-10-24 NOTE — PROGRESS NOTES
Pharmacist Renal Dose Adjustment Note    Vanessa Trammell is a 43 y.o. female being treated with the medication lovenox     Patient Data:    Vital Signs (Most Recent):  Temp: 98.3 °F (36.8 °C) (10/24/23 1355)  Pulse: 72 (10/24/23 1355)  Resp: 16 (10/24/23 1429)  BP: (!) 165/67 (10/24/23 1355)  SpO2: 100 % (10/24/23 1355) Vital Signs (72h Range):  Temp:  [97.9 °F (36.6 °C)-98.4 °F (36.9 °C)]   Pulse:  [72-77]   Resp:  [15-20]   BP: (134-165)/(62-76)   SpO2:  [97 %-100 %]      Recent Labs   Lab 10/24/23  1128   CREATININE 1.2     Serum creatinine: 1.2 mg/dL 10/24/23 1128  Estimated creatinine clearance: 82.2 mL/min    Medication:lovenox 40mg daily will be changed to lovenox 60mg BID for BMI >50    Pharmacist's Name: Nichelle Dempsey  Pharmacist's Extension: [phone]

## 2023-10-24 NOTE — ASSESSMENT & PLAN NOTE
Body mass index is 55.09 kg/m². Morbid obesity complicates all aspects of disease management from diagnostic modalities to treatment. Weight loss encouraged and health benefits explained to patient.

## 2023-10-24 NOTE — H&P
[facility] Medicine  History & Physical    Patient Name: Vanessa Trammell  MRN: [id number]  Patient Class: OP- Observation  Admission Date: 10/24/2023  Attending Physician: Meri Bergman MD   Primary Care Provider: Miles Pritchard MD         Patient information was obtained from patient and ER records.     Subjective:     Principal Problem:Cholelithiasis with acute cholecystitis    Chief Complaint:   Chief Complaint   Patient presents with    Abdominal Pain     RUQ abdominal pain since last night with n/v heart burn. Pt states throwing up gold colored emesis. Pt states hx of gallbladder issues.         HPI: 42 y/o female with PMH of HTN, gallstones, CHF, mitral valve tear, and morbid obesity presented to ER 10/24/23 with c/o RUQ pain that radiates across her back on the right side with associated nausea and vomiting. She reports she has been having pain and gallstones since , she has seen surgeon before and was told it wasn't bad enough for her to have it removed. She reports having attacks approximately 2 times a year and describes it as debilitating for her. She reports she does not eat fried, fatty, or red meat often; however, she had a burger last night that likely triggered her attack.  In ER, labs: H&H 11.0/37.7, CO 2 22, glucose 125. UA with +2 protein, preg test negative. CXR no acute finding. US abdomen with multiple gallstones, no gallbladder wall thickening or tenderness. Common bile duct is borderline prominent measuring 6.1 mm. Afebrile on admission. General surgery consulted and asked for [facility] [place] to admit patient under observation for cholecystitis.       Past Medical History:   Diagnosis Date    CHF (congestive heart failure)     Gallstones     Hypertension     Mitral valve disease     Morbid obesity        Past Surgical History:   Procedure Laterality Date     SECTION      TUBAL LIGATION         Review of patient's allergies indicates:  No Known  Allergies    No current facility-administered medications on file prior to encounter.     Current Outpatient Medications on File Prior to Encounter   Medication Sig    aspirin (ECOTRIN) 81 MG EC tablet Take 1 tablet by mouth once daily.    atorvastatin (LIPITOR) 20 MG tablet TK 1 T PO ONCE A DAY AT NIGHT    carvedilol (COREG) 25 MG tablet TK 1 T PO  BID    ENTRESTO 49-51 mg per tablet Take 1 tablet by mouth 2 (two) times daily.    furosemide (LASIX) 40 MG tablet TK 1 T PO QD    [DISCONTINUED] lisinopril (PRINIVIL,ZESTRIL) 20 MG tablet Take 20 mg by mouth 2 (two) times daily.    [DISCONTINUED] promethazine (PHENERGAN) 12.5 MG Tab Take 1 tablet (12.5 mg total) by mouth every 4 (four) hours as needed.    [DISCONTINUED] oxycodone-acetaminophen (PERCOCET)  mg per tablet Take 0.5-1 tablets by mouth every 4 (four) hours as needed for Pain.     Family History       Problem Relation (Age of Onset)    Hypertension Mother          Tobacco Use    Smoking status: Every Day    Smokeless tobacco: Not on file   Substance and Sexual Activity    Alcohol use: No    Drug use: No    Sexual activity: Not on file     Review of Systems   Constitutional:  Negative for fatigue and fever.   Respiratory:  Negative for cough and shortness of breath.    Cardiovascular:  Negative for chest pain and palpitations.   Gastrointestinal:  Positive for abdominal pain, nausea and vomiting. Negative for constipation and diarrhea.   Genitourinary:  Negative for difficulty urinating.   Neurological:  Negative for weakness.   All other systems reviewed and are negative.    Objective:     Vital Signs (Most Recent):  Temp: 98.1 °F (36.7 °C) (10/24/23 1533)  Pulse: 72 (10/24/23 1539)  Resp: 18 (10/24/23 1533)  BP: 136/63 (10/24/23 1539)  SpO2: 97 % (10/24/23 1533) Vital Signs (24h Range):  Temp:  [97.9 °F (36.6 °C)-98.4 °F (36.9 °C)] 98.1 °F (36.7 °C)  Pulse:  [72-77] 72  Resp:  [15-20] 18  SpO2:  [97 %-100 %] 97 %  BP: (134-165)/(62-82)  136/63     Weight: (!) 141.1 kg (311 lb)  Body mass index is 55.09 kg/m².     Physical Exam  Vitals and nursing note reviewed.   Constitutional:       Appearance: She is obese.   HENT:      Head: Normocephalic.   Eyes:      Pupils: Pupils are equal, round, and reactive to light.   Cardiovascular:      Rate and Rhythm: Normal rate and regular rhythm.      Heart sounds: No murmur heard.  Pulmonary:      Effort: Pulmonary effort is normal.      Breath sounds: Normal breath sounds.   Abdominal:      General: Bowel sounds are normal.      Palpations: Abdomen is soft.      Tenderness: There is abdominal tenderness (RUQ radiating to right flank and back).   Musculoskeletal:         General: Normal range of motion.   Skin:     General: Skin is warm and dry.   Neurological:      General: No focal deficit present.      Mental Status: She is alert and oriented to person, place, and time.   Psychiatric:         Mood and Affect: Mood normal.         Behavior: Behavior normal.              CRANIAL NERVES     CN III, IV, VI   Pupils are equal, round, and reactive to light.       Significant Labs: All pertinent labs within the past 24 hours have been reviewed.  CBC:   Recent Labs   Lab 10/24/23  1128   WBC 10.98   HGB 11.0*   HCT 37.7        CMP:   Recent Labs   Lab 10/24/23  1128      K 4.6      CO2 22*   *   BUN 8   CREATININE 1.2   CALCIUM 8.8   PROT 8.8*   ALBUMIN 3.8   BILITOT 0.3   ALKPHOS 73   AST 13   ALT 12   ANIONGAP 11     Urine Studies:   Recent Labs   Lab 10/24/23  1325   COLORU Yellow   APPEARANCEUA Hazy*   PHUR 7.0   SPECGRAV 1.020   PROTEINUA 2+*   GLUCUA Negative   KETONESU Negative   BILIRUBINUA Negative   OCCULTUA Negative   NITRITE Negative   UROBILINOGEN Negative   LEUKOCYTESUR Negative   RBCUA 1   WBCUA 2   BACTERIA Rare   SQUAMEPITHEL 24   HYALINECASTS 1       Significant Imaging: I have reviewed all pertinent imaging results/findings within the past 24 hours.    Assessment/Plan:      * Cholelithiasis with acute cholecystitis  -US shows multiple gallstones, no gallbladder wall thickening or tenderness, common bile duct is borderline prominent measuring 6.1 mm.   -general surgery consulted, plan for robotic oj tomorrow  -cont Zosyn IV  -IVF hydration  -start full liquid diet, NPO after midnight  -lipase 14  -pain control with IV/PO regimen  -anti-emetics PRN     Nausea and vomiting  -caused from above findings  -no emesis since this morning, but still reports nausea  -cont anti-emetics PRN    Mitral valve disease  -patient says she has a mitral valve tear, she takes Entresto and was told that it would repair it medically  -f/u OP    Morbid obesity  Body mass index is 55.09 kg/m². Morbid obesity complicates all aspects of disease management from diagnostic modalities to treatment. Weight loss encouraged and health benefits explained to patient.    Primary hypertension  Chronic, controlled  -Latest blood pressure and vitals reviewed-   Temp:  [97.9 °F (36.6 °C)-98.4 °F (36.9 °C)]   Pulse:  [72-77]   Resp:  [15-20]   BP: (134-165)/(62-82)   SpO2:  [97 %-100 %] .   -Home meds for hypertension were reviewed and noted below.   Hypertension Medications             carvedilol (COREG) 25 MG tablet TK 1 T PO  BID    ENTRESTO 49-51 mg per tablet Take 1 tablet by mouth 2 (two) times daily.    furosemide (LASIX) 40 MG tablet TK 1 T PO QD        -While in the hospital, will manage blood pressure as follows; Continue home antihypertensive regimen  -optimize pain management    CHF (congestive heart failure)  Patient is identified as having Systolic (HFrEF) heart failure that is Chronic. CHF is currently controlled. Latest ECHO performed and demonstrates- No results found for this or any previous visit.  . Continue ACE/ARB and Furosemide and monitor clinical status closely. Monitor on telemetry. Patient is off CHF pathway.  Monitor strict Is&Os and daily weights.  Place on fluid restriction of 1.5 L.  "Cardiology has been consulted. Continue to stress to patient importance of self efficacy and  on diet for CHF. Last BNP reviewed- and noted below No results for input(s): "BNP", "BNPTRIAGEBLO" in the last 168 hours.    VTE Risk Mitigation (From admission, onward)         Ordered     enoxaparin injection 60 mg  Every 12 hours         10/24/23 1513     IP VTE HIGH RISK PATIENT  Once         10/24/23 1512     Place sequential compression device  Until discontinued         10/24/23 1512                     On 10/24/2023, patient should be placed in hospital observation services under my care in collaboration with Dr. Bergman.          Tiffanie Medrano, NP  Department of Hospital Medicine  [facility] - Telemetry ([facility])    DUE - given  Family history is reviewed and has not changed         "

## 2023-10-24 NOTE — ASSESSMENT & PLAN NOTE
-caused from above findings  -no emesis since this morning, but still reports nausea  -cont anti-emetics PRN

## 2023-10-25 ENCOUNTER — ANESTHESIA EVENT (OUTPATIENT)
Dept: SURGERY | Facility: HOSPITAL | Age: 43
End: 2023-10-25
Payer: MEDICAID

## 2023-10-25 ENCOUNTER — CLINICAL SUPPORT (OUTPATIENT)
Dept: SMOKING CESSATION | Facility: CLINIC | Age: 43
End: 2023-10-25
Payer: COMMERCIAL

## 2023-10-25 ENCOUNTER — ANESTHESIA (OUTPATIENT)
Dept: SURGERY | Facility: HOSPITAL | Age: 43
End: 2023-10-25
Payer: MEDICAID

## 2023-10-25 DIAGNOSIS — F17.200 NICOTINE DEPENDENCE: Primary | ICD-10-CM

## 2023-10-25 PROBLEM — Z01.818 PRE-OP EVALUATION: Status: ACTIVE | Noted: 2023-10-25

## 2023-10-25 LAB
ALBUMIN SERPL BCP-MCNC: 3.4 G/DL (ref 3.5–5.2)
ALP SERPL-CCNC: 73 U/L (ref 55–135)
ALT SERPL W/O P-5'-P-CCNC: 9 U/L (ref 10–44)
ANION GAP SERPL CALC-SCNC: 11 MMOL/L (ref 8–16)
AORTIC ROOT ANNULUS: 2.82 CM
ASCENDING AORTA: 3.18 CM
AST SERPL-CCNC: 12 U/L (ref 10–40)
AV INDEX (PROSTH): 0.8
AV MEAN GRADIENT: 4 MMHG
AV PEAK GRADIENT: 9 MMHG
AV VALVE AREA BY VELOCITY RATIO: 2.69 CM²
AV VALVE AREA: 2.63 CM²
AV VELOCITY RATIO: 0.82
BILIRUB SERPL-MCNC: 0.6 MG/DL (ref 0.1–1)
BSA FOR ECHO PROCEDURE: 2.5 M2
BUN SERPL-MCNC: 8 MG/DL (ref 6–20)
CALCIUM SERPL-MCNC: 8.7 MG/DL (ref 8.7–10.5)
CHLORIDE SERPL-SCNC: 103 MMOL/L (ref 95–110)
CO2 SERPL-SCNC: 20 MMOL/L (ref 23–29)
CREAT SERPL-MCNC: 1.2 MG/DL (ref 0.5–1.4)
CV ECHO LV RWT: 0.47 CM
DOP CALC AO PEAK VEL: 1.47 M/S
DOP CALC AO VTI: 26.3 CM
DOP CALC LVOT AREA: 3.3 CM2
DOP CALC LVOT DIAMETER: 2.05 CM
DOP CALC LVOT PEAK VEL: 1.2 M/S
DOP CALC LVOT STROKE VOLUME: 69.28 CM3
DOP CALC RVOT PEAK VEL: 0.63 M/S
DOP CALC RVOT VTI: 15.2 CM
DOP CALCLVOT PEAK VEL VTI: 21 CM
E WAVE DECELERATION TIME: 210.23 MSEC
E/A RATIO: 1.27
E/E' RATIO: 7.52 M/S
ECHO LV POSTERIOR WALL: 1.12 CM (ref 0.6–1.1)
ERYTHROCYTE [DISTWIDTH] IN BLOOD BY AUTOMATED COUNT: 19.4 % (ref 11.5–14.5)
EST. GFR  (NO RACE VARIABLE): 57 ML/MIN/1.73 M^2
FRACTIONAL SHORTENING: 27 % (ref 28–44)
GLUCOSE SERPL-MCNC: 132 MG/DL (ref 70–110)
HCT VFR BLD AUTO: 37.4 % (ref 37–48.5)
HGB BLD-MCNC: 10.5 G/DL (ref 12–16)
INTERVENTRICULAR SEPTUM: 1.18 CM (ref 0.6–1.1)
IVRT: 125.59 MSEC
LA MAJOR: 4.81 CM
LA MINOR: 4.59 CM
LA WIDTH: 3.5 CM
LEFT ATRIUM SIZE: 3.53 CM
LEFT ATRIUM VOLUME INDEX: 21.2 ML/M2
LEFT ATRIUM VOLUME: 49.33 CM3
LEFT INTERNAL DIMENSION IN SYSTOLE: 3.48 CM (ref 2.1–4)
LEFT VENTRICLE DIASTOLIC VOLUME INDEX: 45.66 ML/M2
LEFT VENTRICLE DIASTOLIC VOLUME: 106.38 ML
LEFT VENTRICLE MASS INDEX: 88 G/M2
LEFT VENTRICLE SYSTOLIC VOLUME INDEX: 21.5 ML/M2
LEFT VENTRICLE SYSTOLIC VOLUME: 50.14 ML
LEFT VENTRICULAR INTERNAL DIMENSION IN DIASTOLE: 4.78 CM (ref 3.5–6)
LEFT VENTRICULAR MASS: 205.01 G
LV LATERAL E/E' RATIO: 6.58 M/S
LV SEPTAL E/E' RATIO: 8.78 M/S
LVOT MG: 2.39 MMHG
LVOT MV: 0.71 CM/S
MCH RBC QN AUTO: 21.3 PG (ref 27–31)
MCHC RBC AUTO-ENTMCNC: 28.1 G/DL (ref 32–36)
MCV RBC AUTO: 76 FL (ref 82–98)
MV PEAK A VEL: 0.62 M/S
MV PEAK E VEL: 0.79 M/S
PISA TR MAX VEL: 2.17 M/S
PLATELET # BLD AUTO: 360 K/UL (ref 150–450)
PMV BLD AUTO: 9.5 FL (ref 9.2–12.9)
POCT GLUCOSE: 119 MG/DL (ref 70–110)
POCT GLUCOSE: 141 MG/DL (ref 70–110)
POTASSIUM SERPL-SCNC: 4.3 MMOL/L (ref 3.5–5.1)
PROT SERPL-MCNC: 8.3 G/DL (ref 6–8.4)
PV MEAN GRADIENT: 1 MMHG
RA MAJOR: 4.01 CM
RA PRESSURE ESTIMATED: 3 MMHG
RBC # BLD AUTO: 4.94 M/UL (ref 4–5.4)
RIGHT VENTRICULAR END-DIASTOLIC DIMENSION: 3.35 CM
RV TB RVSP: 5 MMHG
SODIUM SERPL-SCNC: 134 MMOL/L (ref 136–145)
STJ: 2.68 CM
TDI LATERAL: 0.12 M/S
TDI SEPTAL: 0.09 M/S
TDI: 0.11 M/S
TR MAX PG: 19 MMHG
TRICUSPID ANNULAR PLANE SYSTOLIC EXCURSION: 2.67 CM
TV REST PULMONARY ARTERY PRESSURE: 22 MMHG
WBC # BLD AUTO: 16.74 K/UL (ref 3.9–12.7)
Z-SCORE OF LEFT VENTRICULAR DIMENSION IN END DIASTOLE: -6.81
Z-SCORE OF LEFT VENTRICULAR DIMENSION IN END SYSTOLE: -3.88

## 2023-10-25 PROCEDURE — 99407 PR TOBACCO USE CESSATION INTENSIVE >10 MINUTES: ICD-10-PCS | Mod: S$GLB,,,

## 2023-10-25 PROCEDURE — 99214 PR OFFICE/OUTPT VISIT, EST, LEVL IV, 30-39 MIN: ICD-10-PCS | Mod: 57,,, | Performed by: STUDENT IN AN ORGANIZED HEALTH CARE EDUCATION/TRAINING PROGRAM

## 2023-10-25 PROCEDURE — 25000003 PHARM REV CODE 250: Performed by: SURGERY

## 2023-10-25 PROCEDURE — 94799 UNLISTED PULMONARY SVC/PX: CPT

## 2023-10-25 PROCEDURE — 63600175 PHARM REV CODE 636 W HCPCS: Performed by: STUDENT IN AN ORGANIZED HEALTH CARE EDUCATION/TRAINING PROGRAM

## 2023-10-25 PROCEDURE — 36000711: Performed by: SURGERY

## 2023-10-25 PROCEDURE — 93005 ELECTROCARDIOGRAM TRACING: CPT

## 2023-10-25 PROCEDURE — 63600175 PHARM REV CODE 636 W HCPCS: Performed by: NURSE ANESTHETIST, CERTIFIED REGISTERED

## 2023-10-25 PROCEDURE — 27201423 OPTIME MED/SURG SUP & DEVICES STERILE SUPPLY: Performed by: SURGERY

## 2023-10-25 PROCEDURE — 25000003 PHARM REV CODE 250: Performed by: NURSE PRACTITIONER

## 2023-10-25 PROCEDURE — 85027 COMPLETE CBC AUTOMATED: CPT | Performed by: STUDENT IN AN ORGANIZED HEALTH CARE EDUCATION/TRAINING PROGRAM

## 2023-10-25 PROCEDURE — 63600175 PHARM REV CODE 636 W HCPCS: Performed by: EMERGENCY MEDICINE

## 2023-10-25 PROCEDURE — 63600175 PHARM REV CODE 636 W HCPCS: Performed by: SURGERY

## 2023-10-25 PROCEDURE — 36000710: Performed by: SURGERY

## 2023-10-25 PROCEDURE — 99204 PR OFFICE/OUTPT VISIT, NEW, LEVL IV, 45-59 MIN: ICD-10-PCS | Mod: 25,,, | Performed by: INTERNAL MEDICINE

## 2023-10-25 PROCEDURE — 71000033 HC RECOVERY, INTIAL HOUR: Performed by: SURGERY

## 2023-10-25 PROCEDURE — 99204 OFFICE O/P NEW MOD 45 MIN: CPT | Mod: 57,,, | Performed by: SURGERY

## 2023-10-25 PROCEDURE — 99407 BEHAV CHNG SMOKING > 10 MIN: CPT | Mod: S$GLB,,,

## 2023-10-25 PROCEDURE — 36415 COLL VENOUS BLD VENIPUNCTURE: CPT | Performed by: STUDENT IN AN ORGANIZED HEALTH CARE EDUCATION/TRAINING PROGRAM

## 2023-10-25 PROCEDURE — 37000009 HC ANESTHESIA EA ADD 15 MINS: Performed by: SURGERY

## 2023-10-25 PROCEDURE — 99204 PR OFFICE/OUTPT VISIT, NEW, LEVL IV, 45-59 MIN: ICD-10-PCS | Mod: 57,,, | Performed by: SURGERY

## 2023-10-25 PROCEDURE — 93010 EKG 12-LEAD: ICD-10-PCS | Mod: ,,, | Performed by: INTERNAL MEDICINE

## 2023-10-25 PROCEDURE — 96376 TX/PRO/DX INJ SAME DRUG ADON: CPT | Mod: 59

## 2023-10-25 PROCEDURE — 88304 TISSUE EXAM BY PATHOLOGIST: CPT | Mod: 26,,, | Performed by: STUDENT IN AN ORGANIZED HEALTH CARE EDUCATION/TRAINING PROGRAM

## 2023-10-25 PROCEDURE — 25000003 PHARM REV CODE 250: Performed by: STUDENT IN AN ORGANIZED HEALTH CARE EDUCATION/TRAINING PROGRAM

## 2023-10-25 PROCEDURE — 37000008 HC ANESTHESIA 1ST 15 MINUTES: Performed by: SURGERY

## 2023-10-25 PROCEDURE — 96361 HYDRATE IV INFUSION ADD-ON: CPT | Mod: 59

## 2023-10-25 PROCEDURE — 47562 PR LAP,CHOLECYSTECTOMY: ICD-10-PCS | Mod: ,,, | Performed by: SURGERY

## 2023-10-25 PROCEDURE — 80053 COMPREHEN METABOLIC PANEL: CPT | Performed by: STUDENT IN AN ORGANIZED HEALTH CARE EDUCATION/TRAINING PROGRAM

## 2023-10-25 PROCEDURE — 25000003 PHARM REV CODE 250: Performed by: EMERGENCY MEDICINE

## 2023-10-25 PROCEDURE — 25000003 PHARM REV CODE 250: Performed by: NURSE ANESTHETIST, CERTIFIED REGISTERED

## 2023-10-25 PROCEDURE — 47562 LAPAROSCOPIC CHOLECYSTECTOMY: CPT | Mod: ,,, | Performed by: SURGERY

## 2023-10-25 PROCEDURE — 99204 OFFICE O/P NEW MOD 45 MIN: CPT | Mod: 25,,, | Performed by: INTERNAL MEDICINE

## 2023-10-25 PROCEDURE — 88304 PR  SURG PATH,LEVEL III: ICD-10-PCS | Mod: 26,,, | Performed by: STUDENT IN AN ORGANIZED HEALTH CARE EDUCATION/TRAINING PROGRAM

## 2023-10-25 PROCEDURE — 99214 OFFICE O/P EST MOD 30 MIN: CPT | Mod: 57,,, | Performed by: STUDENT IN AN ORGANIZED HEALTH CARE EDUCATION/TRAINING PROGRAM

## 2023-10-25 PROCEDURE — 88304 TISSUE EXAM BY PATHOLOGIST: CPT | Performed by: STUDENT IN AN ORGANIZED HEALTH CARE EDUCATION/TRAINING PROGRAM

## 2023-10-25 PROCEDURE — 96366 THER/PROPH/DIAG IV INF ADDON: CPT | Mod: 59

## 2023-10-25 PROCEDURE — 93010 ELECTROCARDIOGRAM REPORT: CPT | Mod: ,,, | Performed by: INTERNAL MEDICINE

## 2023-10-25 PROCEDURE — 27000221 HC OXYGEN, UP TO 24 HOURS

## 2023-10-25 PROCEDURE — G0378 HOSPITAL OBSERVATION PER HR: HCPCS

## 2023-10-25 PROCEDURE — 99900035 HC TECH TIME PER 15 MIN (STAT)

## 2023-10-25 RX ORDER — INDOCYANINE GREEN AND WATER 25 MG
2.5 KIT INJECTION ONCE
Status: DISCONTINUED | OUTPATIENT
Start: 2023-10-25 | End: 2023-10-25 | Stop reason: HOSPADM

## 2023-10-25 RX ORDER — ONDANSETRON 8 MG/1
8 TABLET, ORALLY DISINTEGRATING ORAL EVERY 8 HOURS PRN
Status: DISCONTINUED | OUTPATIENT
Start: 2023-10-25 | End: 2023-10-26 | Stop reason: HOSPADM

## 2023-10-25 RX ORDER — HYDROMORPHONE HYDROCHLORIDE 1 MG/ML
1 INJECTION, SOLUTION INTRAMUSCULAR; INTRAVENOUS; SUBCUTANEOUS EVERY 4 HOURS PRN
Status: DISCONTINUED | OUTPATIENT
Start: 2023-10-25 | End: 2023-10-26 | Stop reason: HOSPADM

## 2023-10-25 RX ORDER — HYDROMORPHONE HYDROCHLORIDE 2 MG/ML
0.2 INJECTION, SOLUTION INTRAMUSCULAR; INTRAVENOUS; SUBCUTANEOUS EVERY 5 MIN PRN
Status: DISCONTINUED | OUTPATIENT
Start: 2023-10-25 | End: 2023-10-25 | Stop reason: HOSPADM

## 2023-10-25 RX ORDER — METOCLOPRAMIDE HYDROCHLORIDE 5 MG/ML
5 INJECTION INTRAMUSCULAR; INTRAVENOUS EVERY 6 HOURS PRN
Status: DISCONTINUED | OUTPATIENT
Start: 2023-10-25 | End: 2023-10-26 | Stop reason: HOSPADM

## 2023-10-25 RX ORDER — PROPOFOL 10 MG/ML
VIAL (ML) INTRAVENOUS
Status: DISCONTINUED | OUTPATIENT
Start: 2023-10-25 | End: 2023-10-25

## 2023-10-25 RX ORDER — CEFAZOLIN SODIUM 1 G/3ML
INJECTION, POWDER, FOR SOLUTION INTRAMUSCULAR; INTRAVENOUS
Status: DISCONTINUED | OUTPATIENT
Start: 2023-10-25 | End: 2023-10-25

## 2023-10-25 RX ORDER — OXYCODONE HYDROCHLORIDE 5 MG/1
10 TABLET ORAL EVERY 6 HOURS PRN
Status: DISCONTINUED | OUTPATIENT
Start: 2023-10-25 | End: 2023-10-26 | Stop reason: HOSPADM

## 2023-10-25 RX ORDER — INDOCYANINE GREEN AND WATER 25 MG
KIT INJECTION
Status: DISCONTINUED | OUTPATIENT
Start: 2023-10-25 | End: 2023-10-25

## 2023-10-25 RX ORDER — OXYCODONE HYDROCHLORIDE 5 MG/1
5 TABLET ORAL EVERY 6 HOURS PRN
Status: DISCONTINUED | OUTPATIENT
Start: 2023-10-25 | End: 2023-10-26 | Stop reason: HOSPADM

## 2023-10-25 RX ORDER — MIDAZOLAM HYDROCHLORIDE 1 MG/ML
INJECTION INTRAMUSCULAR; INTRAVENOUS
Status: DISCONTINUED | OUTPATIENT
Start: 2023-10-25 | End: 2023-10-25

## 2023-10-25 RX ORDER — PHENYLEPHRINE HYDROCHLORIDE 10 MG/ML
INJECTION INTRAVENOUS
Status: DISCONTINUED | OUTPATIENT
Start: 2023-10-25 | End: 2023-10-25

## 2023-10-25 RX ORDER — ENOXAPARIN SODIUM 100 MG/ML
60 INJECTION SUBCUTANEOUS EVERY 12 HOURS
Status: DISCONTINUED | OUTPATIENT
Start: 2023-10-26 | End: 2023-10-26 | Stop reason: HOSPADM

## 2023-10-25 RX ORDER — SUCCINYLCHOLINE CHLORIDE 20 MG/ML
INJECTION INTRAMUSCULAR; INTRAVENOUS
Status: DISCONTINUED | OUTPATIENT
Start: 2023-10-25 | End: 2023-10-25

## 2023-10-25 RX ORDER — DIPHENHYDRAMINE HCL 25 MG
25 CAPSULE ORAL EVERY 6 HOURS PRN
Status: DISCONTINUED | OUTPATIENT
Start: 2023-10-25 | End: 2023-10-26 | Stop reason: HOSPADM

## 2023-10-25 RX ORDER — LIDOCAINE HYDROCHLORIDE 20 MG/ML
INJECTION INTRAVENOUS
Status: DISCONTINUED | OUTPATIENT
Start: 2023-10-25 | End: 2023-10-25

## 2023-10-25 RX ORDER — ROCURONIUM BROMIDE 10 MG/ML
INJECTION, SOLUTION INTRAVENOUS
Status: DISCONTINUED | OUTPATIENT
Start: 2023-10-25 | End: 2023-10-25

## 2023-10-25 RX ORDER — ONDANSETRON 2 MG/ML
4 INJECTION INTRAMUSCULAR; INTRAVENOUS DAILY PRN
Status: DISCONTINUED | OUTPATIENT
Start: 2023-10-25 | End: 2023-10-25 | Stop reason: HOSPADM

## 2023-10-25 RX ORDER — CHLORHEXIDINE GLUCONATE ORAL RINSE 1.2 MG/ML
10 SOLUTION DENTAL 2 TIMES DAILY
Status: DISCONTINUED | OUTPATIENT
Start: 2023-10-25 | End: 2023-10-26 | Stop reason: HOSPADM

## 2023-10-25 RX ORDER — BUPIVACAINE HYDROCHLORIDE 2.5 MG/ML
INJECTION, SOLUTION EPIDURAL; INFILTRATION; INTRACAUDAL
Status: DISCONTINUED | OUTPATIENT
Start: 2023-10-25 | End: 2023-10-25 | Stop reason: HOSPADM

## 2023-10-25 RX ORDER — FENTANYL CITRATE 50 UG/ML
INJECTION, SOLUTION INTRAMUSCULAR; INTRAVENOUS
Status: DISCONTINUED | OUTPATIENT
Start: 2023-10-25 | End: 2023-10-25

## 2023-10-25 RX ORDER — SODIUM CHLORIDE, SODIUM LACTATE, POTASSIUM CHLORIDE, CALCIUM CHLORIDE 600; 310; 30; 20 MG/100ML; MG/100ML; MG/100ML; MG/100ML
INJECTION, SOLUTION INTRAVENOUS CONTINUOUS
Status: DISCONTINUED | OUTPATIENT
Start: 2023-10-25 | End: 2023-10-26 | Stop reason: HOSPADM

## 2023-10-25 RX ORDER — OXYCODONE AND ACETAMINOPHEN 5; 325 MG/1; MG/1
1 TABLET ORAL
Status: DISCONTINUED | OUTPATIENT
Start: 2023-10-25 | End: 2023-10-25 | Stop reason: HOSPADM

## 2023-10-25 RX ORDER — VASOPRESSIN 20 [USP'U]/ML
INJECTION, SOLUTION INTRAMUSCULAR; SUBCUTANEOUS
Status: DISCONTINUED | OUTPATIENT
Start: 2023-10-25 | End: 2023-10-25

## 2023-10-25 RX ADMIN — ROCURONIUM BROMIDE 5 MG: 10 SOLUTION INTRAVENOUS at 01:10

## 2023-10-25 RX ADMIN — SUCCINYLCHOLINE CHLORIDE 140 MG: 20 INJECTION, SOLUTION INTRAMUSCULAR; INTRAVENOUS; PARENTERAL at 01:10

## 2023-10-25 RX ADMIN — PIPERACILLIN SODIUM AND TAZOBACTAM SODIUM 4.5 G: 4; .5 INJECTION, POWDER, FOR SOLUTION INTRAVENOUS at 09:10

## 2023-10-25 RX ADMIN — VASOPRESSIN 2 UNITS: 20 INJECTION INTRAVENOUS at 01:10

## 2023-10-25 RX ADMIN — VASOPRESSIN 2 UNITS: 20 INJECTION INTRAVENOUS at 02:10

## 2023-10-25 RX ADMIN — OXYCODONE HYDROCHLORIDE 5 MG: 5 TABLET ORAL at 12:10

## 2023-10-25 RX ADMIN — MIDAZOLAM HYDROCHLORIDE 2 MG: 1 INJECTION, SOLUTION INTRAMUSCULAR; INTRAVENOUS at 01:10

## 2023-10-25 RX ADMIN — PIPERACILLIN SODIUM AND TAZOBACTAM SODIUM 4.5 G: 4; .5 INJECTION, POWDER, FOR SOLUTION INTRAVENOUS at 05:10

## 2023-10-25 RX ADMIN — PIPERACILLIN SODIUM AND TAZOBACTAM SODIUM 4.5 G: 4; .5 INJECTION, POWDER, FOR SOLUTION INTRAVENOUS at 12:10

## 2023-10-25 RX ADMIN — SODIUM CHLORIDE, POTASSIUM CHLORIDE, SODIUM LACTATE AND CALCIUM CHLORIDE: 600; 310; 30; 20 INJECTION, SOLUTION INTRAVENOUS at 05:10

## 2023-10-25 RX ADMIN — HYDROMORPHONE HYDROCHLORIDE 1 MG: 1 INJECTION, SOLUTION INTRAMUSCULAR; INTRAVENOUS; SUBCUTANEOUS at 10:10

## 2023-10-25 RX ADMIN — PHENYLEPHRINE HYDROCHLORIDE 200 MCG: 10 INJECTION INTRAVENOUS at 01:10

## 2023-10-25 RX ADMIN — CEFAZOLIN 3 G: 330 INJECTION, POWDER, FOR SOLUTION INTRAMUSCULAR; INTRAVENOUS at 01:10

## 2023-10-25 RX ADMIN — HYDROMORPHONE HYDROCHLORIDE 0.2 MG: 1 INJECTION, SOLUTION INTRAMUSCULAR; INTRAVENOUS; SUBCUTANEOUS at 03:10

## 2023-10-25 RX ADMIN — PHENYLEPHRINE HYDROCHLORIDE 100 MCG: 10 INJECTION INTRAVENOUS at 01:10

## 2023-10-25 RX ADMIN — HYDROMORPHONE HYDROCHLORIDE 0.2 MG: 1 INJECTION, SOLUTION INTRAMUSCULAR; INTRAVENOUS; SUBCUTANEOUS at 09:10

## 2023-10-25 RX ADMIN — PROPOFOL 150 MG: 10 INJECTION, EMULSION INTRAVENOUS at 01:10

## 2023-10-25 RX ADMIN — FENTANYL CITRATE 100 MCG: 50 INJECTION, SOLUTION INTRAMUSCULAR; INTRAVENOUS at 01:10

## 2023-10-25 RX ADMIN — SODIUM CHLORIDE, SODIUM LACTATE, POTASSIUM CHLORIDE, AND CALCIUM CHLORIDE: .6; .31; .03; .02 INJECTION, SOLUTION INTRAVENOUS at 01:10

## 2023-10-25 RX ADMIN — ONDANSETRON 4 MG: 2 INJECTION INTRAMUSCULAR; INTRAVENOUS at 03:10

## 2023-10-25 RX ADMIN — LIDOCAINE HYDROCHLORIDE 100 MG: 20 INJECTION INTRAVENOUS at 01:10

## 2023-10-25 RX ADMIN — CHLORHEXIDINE GLUCONATE 0.12% ORAL RINSE 10 ML: 1.2 LIQUID ORAL at 08:10

## 2023-10-25 RX ADMIN — INDOCYANINE GREEN AND WATER 2.5 MG: KIT at 01:10

## 2023-10-25 RX ADMIN — CARVEDILOL 25 MG: 12.5 TABLET, FILM COATED ORAL at 09:10

## 2023-10-25 RX ADMIN — OXYCODONE HYDROCHLORIDE 10 MG: 5 TABLET ORAL at 05:10

## 2023-10-25 NOTE — ASSESSMENT & PLAN NOTE
-Stable CV wise  -No CP/angina  -Echo with normal EF, normal mitral valve function  -EKG SR, non-specific T wave abnormality, no dynamic ischemic changes  -No cardiac contraindications to proceed with surgery. Low-moderate risk of dennis and post OP CV complications.

## 2023-10-25 NOTE — ASSESSMENT & PLAN NOTE
Increases her risk of wound complications.      Patient should discuss weight loss options with her primary provider

## 2023-10-25 NOTE — SUBJECTIVE & OBJECTIVE
Past Medical History:   Diagnosis Date    CHF (congestive heart failure)     Gallstones     Hypertension     Mitral valve disease     Morbid obesity        Past Surgical History:   Procedure Laterality Date     SECTION      TUBAL LIGATION         Review of patient's allergies indicates:  No Known Allergies    No current facility-administered medications on file prior to encounter.     Current Outpatient Medications on File Prior to Encounter   Medication Sig    aspirin (ECOTRIN) 81 MG EC tablet Take 1 tablet by mouth once daily.    atorvastatin (LIPITOR) 20 MG tablet TK 1 T PO ONCE A DAY AT NIGHT    carvedilol (COREG) 25 MG tablet TK 1 T PO  BID    ENTRESTO 49-51 mg per tablet Take 1 tablet by mouth 2 (two) times daily.    furosemide (LASIX) 40 MG tablet TK 1 T PO QD     Family History       Problem Relation (Age of Onset)    Hypertension Mother          Tobacco Use    Smoking status: Every Day    Smokeless tobacco: Not on file   Substance and Sexual Activity    Alcohol use: No    Drug use: No    Sexual activity: Not on file     Review of Systems   Constitutional: Positive for malaise/fatigue.   HENT: Negative.     Eyes: Negative.    Cardiovascular: Negative.    Respiratory: Negative.     Endocrine: Negative.    Hematologic/Lymphatic: Negative.    Skin: Negative.    Musculoskeletal: Negative.    Gastrointestinal:  Positive for abdominal pain (RUQ), nausea and vomiting.   Genitourinary: Negative.    Neurological: Negative.    Psychiatric/Behavioral: Negative.     Allergic/Immunologic: Negative.      Objective:     Vital Signs (Most Recent):  Temp: 98.9 °F (37.2 °C) (10/25/23 0745)  Pulse: 86 (10/25/23 0745)  Resp: 18 (10/25/23 07)  BP: (!) 107/56 (10/25/23 0745)  SpO2: 97 % (10/25/23 0745) Vital Signs (24h Range):  Temp:  [97.9 °F (36.6 °C)-98.9 °F (37.2 °C)] 98.9 °F (37.2 °C)  Pulse:  [72-86] 86  Resp:  [15-20] 18  SpO2:  [93 %-100 %] 97 %  BP: (107-165)/(55-82) 107/56     Weight: (!)  141.1 kg (311 lb)  Body mass index is 55.09 kg/m².    SpO2: 97 %         Intake/Output Summary (Last 24 hours) at 10/25/2023 0936  Last data filed at 10/24/2023 1800  Gross per 24 hour   Intake 99.08 ml   Output --   Net 99.08 ml       Lines/Drains/Airways       Peripheral Intravenous Line  Duration                  Peripheral IV - Single Lumen 10/24/23 1132 20 G Right Antecubital <1 day                     Physical Exam  Vitals and nursing note reviewed.   Constitutional:       General: She is not in acute distress.     Appearance: She is well-developed. She is obese. She is not diaphoretic.   HENT:      Head: Normocephalic and atraumatic.   Eyes:      General:         Right eye: No discharge.         Left eye: No discharge.      Pupils: Pupils are equal, round, and reactive to light.   Cardiovascular:      Rate and Rhythm: Normal rate and regular rhythm.      Heart sounds: Normal heart sounds, S1 normal and S2 normal. No murmur heard.  Pulmonary:      Effort: Pulmonary effort is normal. No respiratory distress.      Breath sounds: Normal breath sounds. No wheezing or rales.   Abdominal:      General: There is no distension.      Tenderness: There is abdominal tenderness (RUQ). There is no rebound.   Musculoskeletal:      Right lower leg: No edema.      Left lower leg: No edema.   Skin:     General: Skin is warm and dry.      Findings: No erythema.   Neurological:      General: No focal deficit present.      Mental Status: She is alert and oriented to person, place, and time.   Psychiatric:         Mood and Affect: Mood normal.         Behavior: Behavior normal.         Thought Content: Thought content normal.        Significant Labs: CMP   Recent Labs   Lab 10/24/23  1128 10/25/23  0456    134*   K 4.6 4.3    103   CO2 22* 20*   * 132*   BUN 8 8   CREATININE 1.2 1.2   CALCIUM 8.8 8.7   PROT 8.8* 8.3   ALBUMIN 3.8 3.4*   BILITOT 0.3 0.6   ALKPHOS 73 73   AST 13 12   ALT 12 9*   ANIONGAP 11 11   ,  "CBC   Recent Labs   Lab 10/24/23  1128 10/25/23  0456   WBC 10.98 16.74*   HGB 11.0* 10.5*   HCT 37.7 37.4    360   , Troponin No results for input(s): "TROPONINI" in the last 48 hours., and All pertinent lab results from the last 24 hours have been reviewed.    Significant Imaging: Echocardiogram: Transthoracic echo (TTE) complete (Cupid Only):   Results for orders placed or performed during the hospital encounter of 10/24/23   Echo Saline Bubble? Yes   Result Value Ref Range    BSA 2.5 m2    LVOT stroke volume 69.28 cm3    LVIDd 4.78 3.5 - 6.0 cm    LV Systolic Volume 50.14 mL    LV Systolic Volume Index 21.5 mL/m2    LVIDs 3.48 2.1 - 4.0 cm    LV Diastolic Volume 106.38 mL    LV Diastolic Volume Index 45.66 mL/m2    IVS 1.18 (A) 0.6 - 1.1 cm    LVOT diameter 2.05 cm    LVOT area 3.3 cm2    FS 27 (A) 28 - 44 %    Left Ventricle Relative Wall Thickness 0.47 cm    Posterior Wall 1.12 (A) 0.6 - 1.1 cm    LV mass 205.01 g    LV Mass Index 88 g/m2    MV Peak E Chip 0.79 m/s    TDI LATERAL 0.12 m/s    TDI SEPTAL 0.09 m/s    E/E' ratio 7.52 m/s    MV Peak A Chip 0.62 m/s    TR Max Chip 2.17 m/s    E/A ratio 1.27     IVRT 125.59 msec    E wave deceleration time 210.23 msec    LV SEPTAL E/E' RATIO 8.78 m/s    LV LATERAL E/E' RATIO 6.58 m/s    LVOT peak chip 1.20 m/s    Left Ventricular Outflow Tract Mean Velocity 0.71 cm/s    Left Ventricular Outflow Tract Mean Gradient 2.39 mmHg    LA size 3.53 cm    Left Atrium Minor Axis 4.59 cm    Left Atrium Major Axis 4.81 cm    RVDD 3.35 cm    RVOT peak VTI 15.2 cm    TAPSE 2.67 cm    RA Major Axis 4.01 cm    AV mean gradient 4 mmHg    AV peak gradient 9 mmHg    Ao peak chip 1.47 m/s    Ao VTI 26.30 cm    LVOT peak VTI 21.00 cm    AV valve area 2.63 cm²    AV Velocity Ratio 0.82     AV index (prosthetic) 0.80     ALBERTINA by Velocity Ratio 2.69 cm²    Triscuspid Valve Regurgitation Peak Gradient 19 mmHg    PV mean gradient 1 mmHg    RVOT peak chip 0.63 m/s    Ao root annulus 2.82 cm    " STJ 2.68 cm    Ascending aorta 3.18 cm    Mean e' 0.11 m/s    ZLVIDS -3.88     ZLVIDD -6.81     LA Volume Index 21.2 mL/m2    LA volume 49.33 cm3    LA WIDTH 3.5 cm    TV resting pulmonary artery pressure 22 mmHg    RV TB RVSP 5 mmHg    Est. RA pres 3 mmHg    Narrative      Left Ventricle: The left ventricle is normal in size. Normal wall   thickness. There is concentric remodeling. Normal wall motion. There is   normal systolic function with a visually estimated ejection fraction of 55   - 60%. There is normal diastolic function.    Right Ventricle: Normal right ventricular cavity size. Wall thickness   is normal. Right ventricle wall motion  is normal. Systolic function is   normal.    Tricuspid Valve: There is mild regurgitation with a centrally directed   jet.    Pulmonary Artery: The estimated pulmonary artery systolic pressure is   22 mmHg.    IVC/SVC: Normal venous pressure at 3 mmHg.     , EKG: Reviewed, and X-Ray: CXR: X-Ray Chest 1 View (CXR): No results found for this visit on 10/24/23. and X-Ray Chest PA and Lateral (CXR): No results found for this visit on 10/24/23.

## 2023-10-25 NOTE — SUBJECTIVE & OBJECTIVE
Interval History:  Patient states she is feeling better.  Less abdominal pain.  Echocardiogram performed.  Cardiology says she is a low/moderate risk.    Medications:  Continuous Infusions:  Scheduled Meds:   carvediloL  25 mg Oral BID    enoxparin  60 mg Subcutaneous Q12H (prophylaxis, 0900/2100)    furosemide  40 mg Oral Daily    piperacillin-tazobactam (Zosyn) IV (PEDS and ADULTS) (extended infusion is not appropriate)  4.5 g Intravenous Q8H    sacubitriL-valsartan  1 tablet Oral BID     PRN Meds:sodium chloride 0.9%, acetaminophen, dextrose 10%, dextrose 10%, glucagon (human recombinant), glucose, glucose, HYDROmorphone, insulin aspart U-100, naloxone, ondansetron, oxyCODONE, promethazine, sodium chloride 0.9%     Review of patient's allergies indicates:  No Known Allergies  Objective:     Vital Signs (Most Recent):  Temp: 98.2 °F (36.8 °C) (10/25/23 1120)  Pulse: 85 (10/25/23 1120)  Resp: 18 (10/25/23 1120)  BP: 131/64 (10/25/23 1120)  SpO2: (!) 94 % (10/25/23 1120) Vital Signs (24h Range):  Temp:  [98 °F (36.7 °C)-98.9 °F (37.2 °C)] 98.2 °F (36.8 °C)  Pulse:  [72-86] 85  Resp:  [15-19] 18  SpO2:  [93 %-100 %] 94 %  BP: (107-165)/(55-82) 131/64     Weight: (!) 141.1 kg (311 lb)  Body mass index is 55.09 kg/m².    Intake/Output - Last 3 Shifts         10/23 0700  10/24 0659 10/24 0700  10/25 0659 10/25 0700  10/26 0659    I.V. (mL/kg)  2.4 (0)     IV Piggyback  96.7     Total Intake(mL/kg)  99.1 (0.7)     Net  +99.1            Urine Occurrence  2 x              Physical Exam  Vitals and nursing note reviewed.   Constitutional:       Appearance: She is well-developed. She is obese.   HENT:      Head: Normocephalic.   Eyes:      General: No scleral icterus.     Pupils: Pupils are equal, round, and reactive to light.   Neck:      Thyroid: No thyromegaly.      Vascular: No JVD.      Trachea: No tracheal deviation.   Cardiovascular:      Rate and Rhythm: Normal rate and regular rhythm.      Heart sounds: Normal heart  "sounds.   Pulmonary:      Breath sounds: Normal breath sounds. No wheezing.   Abdominal:      General: Bowel sounds are normal. There is no distension.      Palpations: Abdomen is soft. Abdomen is not rigid. There is no mass.      Tenderness: There is no abdominal tenderness. There is no guarding or rebound.      Comments: Significantly obese   Musculoskeletal:         General: Normal range of motion.      Right lower leg: No edema.      Left lower leg: No edema.   Lymphadenopathy:      Cervical: No cervical adenopathy.   Skin:     General: Skin is warm and dry.      Findings: No erythema or rash.      Comments: Multiple tattoos   Neurological:      Mental Status: She is oriented to person, place, and time.   Psychiatric:         Mood and Affect: Mood normal.         Behavior: Behavior normal.         Thought Content: Thought content normal.         Judgment: Judgment normal.          Significant Labs:  I have reviewed all pertinent lab results within the past 24 hours.  CBC:   Recent Labs   Lab 10/25/23  0456   WBC 16.74*   RBC 4.94   HGB 10.5*   HCT 37.4      MCV 76*   MCH 21.3*   MCHC 28.1*     CMP:   Recent Labs   Lab 10/25/23  0456   *   CALCIUM 8.7   ALBUMIN 3.4*   PROT 8.3   *   K 4.3   CO2 20*      BUN 8   CREATININE 1.2   ALKPHOS 73   ALT 9*   AST 12   BILITOT 0.6     Coagulation: No results for input(s): "LABPROT", "INR", "APTT" in the last 168 hours.    Significant Diagnostics:  I have reviewed all pertinent imaging results/findings within the past 24 hours.  Ultrasound was reviewed    Narrative & Impression  EXAMINATION:  US ABDOMEN LIMITED     CLINICAL HISTORY:  Right upper quadrant pain     COMPARISON:  None     FINDINGS:  The liver is appears enlarged measuring 18 cm.  No liver masses.  Multiple small gallstones.  Gallstones measure up to 7 mm.  No gallbladder wall thickening or tenderness.  Common bile duct is borderline prominent measuring 6.1 mm.  No intrahepatic bile duct " dilatation     The portal vein is grossly normal.  The pancreas is normal.  The right kidney is normal at 10.8 cm.     No ascites.  The aorta and IVC are normal.  The adrenals are not included.     Impression:     Multiple gallstones.  No gallbladder wall thickening or tenderness.  Common bile duct is borderline prominent measuring 6.1 mm.  Correlation with liver function enzymes recommended.  Consider MRCP if bile duct obstruction is suspected.        Electronically signed by: Amandeep Rothman MD  Date:                                            10/24/2023

## 2023-10-25 NOTE — HOSPITAL COURSE
10/25/2023.  The patient states she is feeling better.  Less right upper quadrant pain.  White count however is elevated.  She is been seen by Cardiology and has a low to moderate risk of a cardiac event.  This was discussed with her.  For robotic assisted cholecystectomy today    10/26/2023.  Incisional soreness.  No right upper quadrant pain.  White count decreasing, converted to Augmentin.  Liver function tests normal.  Patient can be given a regular diet and if tolerated discharge home  per hospital Medicine.  Office will need to arrange follow-up appointment due to her having Medicaid

## 2023-10-25 NOTE — ANESTHESIA PREPROCEDURE EVALUATION
"                                                                                                             10/25/2023  Vanessa Trammell is a 43 y.o., female patient whose current medical conditions include super morbid obesity, gallstones, HTN, CHF, and prior mitral valve tear who presented to [facility] ED yesterday with a chief complaint of RUQ pain associated with nausea/vomiting, similar to her prior gallbladder attacks in the past.   Cardiology asked to see for pre-op evaluation. Patient seen and examined today resting in bed. Still complains of RUQ abdominal pain. No CV complaints. Remains chest pain free. No history of CAD or MI. Echo with normal EF, normal mitral valve function. EKG SR, non-specific T wave abnormality, no dynamic ischemic changes.  Cleared by cards on 10/25/23: "low-mod risk"    Patient Active Problem List   Diagnosis    CHF (congestive heart failure)    Primary hypertension    Cholelithiasis with acute cholecystitis    Morbid obesity    Mitral valve disease    Nausea and vomiting    Pre-op evaluation     Past Medical History:   Diagnosis Date    CHF (congestive heart failure)     Gallstones     Hypertension     Mitral valve disease     Morbid obesity      Past Surgical History:   Procedure Laterality Date     SECTION      TUBAL LIGATION         Chemistry        Component Value Date/Time     (L) 10/25/2023 0456    K 4.3 10/25/2023 0456     10/25/2023 0456    CO2 20 (L) 10/25/2023 0456    BUN 8 10/25/2023 0456    CREATININE 1.2 10/25/2023 0456     (H) 10/25/2023 0456        Component Value Date/Time    CALCIUM 8.7 10/25/2023 0456    ALKPHOS 73 10/25/2023 0456    AST 12 10/25/2023 0456    ALT 9 (L) 10/25/2023 0456    BILITOT 0.6 10/25/2023 0456    ESTGFRAFRICA >60 2016 0932    EGFRNONAA >60 2016 0932        Lab Results   Component Value Date    WBC 16.74 (H) 10/25/2023    HGB 10.5 (L) 10/25/2023    HCT 37.4 10/25/2023    MCV 76 (L) 10/25/2023    "  10/25/2023     ECHO: (10/24/23)  Summary         Left Ventricle: The left ventricle is normal in size. Normal wall thickness. There is concentric remodeling. Normal wall motion. There is normal systolic function with a visually estimated ejection fraction of 55 - 60%. There is normal diastolic function.    Right Ventricle: Normal right ventricular cavity size. Wall thickness is normal. Right ventricle wall motion  is normal. Systolic function is normal.    Tricuspid Valve: There is mild regurgitation with a centrally directed jet.    Pulmonary Artery: The estimated pulmonary artery systolic pressure is 22 mmHg.    IVC/SVC: Normal venous pressure at 3 mmHg.    Pre-op Assessment    I have reviewed the Patient Summary Reports.    I have reviewed the NPO Status.   I have reviewed the Medications.     Review of Systems  Anesthesia Hx:  No problems with previous Anesthesia  History of prior surgery of interest to airway management or planning: Previous anesthesia: General  Denies Personal Hx of Anesthesia complications.   Social:  Former Smoker    Hematology/Oncology:  Hematology Normal   Oncology Normal   Hematology Comments: 10.5/37.4    Cardiovascular:   Hypertension CHF ECG has been reviewed. Normal sinus rhythm (86)  Nonspecific T wave abnormality   Abnormal ECG   When compared with ECG of 11-FEB-2016 09:27,   Nonspecific T wave abnormality now evident in Anterior leads    Pulmonary:  Pulmonary Normal    Renal/:  Renal/ Normal     Musculoskeletal:  Musculoskeletal Normal    Neurological:  Neurology Normal    Endocrine:  Endocrine Normal Super morbid obesity -- BMI 55 Morbid Obesity / BMI > 40      Physical Exam  General: Cooperative, Alert and Oriented    Airway:  Mallampati: IV   Mouth Opening: Small, but > 3cm  TM Distance: < 4 cm  Tongue: Large  Neck ROM: Normal ROM  Neck: Girth Increased    Dental:  Partial Dentures        Anesthesia Plan  Type of Anesthesia, risks & benefits  discussed:    Anesthesia Type: Gen ETT  Intra-op Monitoring Plan: Standard ASA Monitors  Post Op Pain Control Plan: multimodal analgesia and IV/PO Opioids PRN  Induction:  IV  Airway Plan: Video, Post-Induction  Informed Consent: Informed consent signed with the Patient and all parties understand the risks and agree with anesthesia plan.  All questions answered.   ASA Score: 3  Day of Surgery Review of History & Physical: H&P Update referred to the surgeon/provider.  Anesthesia Plan Notes: Difficult airway anticipated - recommend Davis first attempt     Ready For Surgery From Anesthesia Perspective.     .

## 2023-10-25 NOTE — PROGRESS NOTES
[facility] ([facility])  General Surgery  Progress Note    Subjective:     History of Present Illness:  Pt is a 42yo F who presents with acute RUQ pain. Reports on and off again RUQ sharp pain radiating to her back since 2011. This episode began 2 days ago, associated with fevers to 101.3F, chills, nausea and vomiting. Nothing has improved the pain and generally her pain is much better by now. Avoids fatty/fried food as that worsens the pain.        Post-Op Info:  Procedure(s) (LRB):  ROBOTIC CHOLECYSTECTOMY (N/A)   Day of Surgery     Interval History:  Patient states she is feeling better.  Less abdominal pain.  Echocardiogram performed.  Cardiology says she is a low/moderate risk.    Medications:  Continuous Infusions:  Scheduled Meds:   carvediloL  25 mg Oral BID    enoxparin  60 mg Subcutaneous Q12H (prophylaxis, 0900/2100)    furosemide  40 mg Oral Daily    piperacillin-tazobactam (Zosyn) IV (PEDS and ADULTS) (extended infusion is not appropriate)  4.5 g Intravenous Q8H    sacubitriL-valsartan  1 tablet Oral BID     PRN Meds:sodium chloride 0.9%, acetaminophen, dextrose 10%, dextrose 10%, glucagon (human recombinant), glucose, glucose, HYDROmorphone, insulin aspart U-100, naloxone, ondansetron, oxyCODONE, promethazine, sodium chloride 0.9%     Review of patient's allergies indicates:  No Known Allergies  Objective:     Vital Signs (Most Recent):  Temp: 98.2 °F (36.8 °C) (10/25/23 1120)  Pulse: 85 (10/25/23 1120)  Resp: 18 (10/25/23 1120)  BP: 131/64 (10/25/23 1120)  SpO2: (!) 94 % (10/25/23 1120) Vital Signs (24h Range):  Temp:  [98 °F (36.7 °C)-98.9 °F (37.2 °C)] 98.2 °F (36.8 °C)  Pulse:  [72-86] 85  Resp:  [15-19] 18  SpO2:  [93 %-100 %] 94 %  BP: (107-165)/(55-82) 131/64     Weight: (!) 141.1 kg (311 lb)  Body mass index is 55.09 kg/m².    Intake/Output - Last 3 Shifts         10/23 0700  10/24 0659 10/24 0700  10/25 0659 10/25 0700  10/26 0659    I.V. (mL/kg)  2.4 (0)     IV Piggyback  96.7      "Total Intake(mL/kg)  99.1 (0.7)     Net  +99.1            Urine Occurrence  2 x              Physical Exam  Vitals and nursing note reviewed.   Constitutional:       Appearance: She is well-developed. She is obese.   HENT:      Head: Normocephalic.   Eyes:      General: No scleral icterus.     Pupils: Pupils are equal, round, and reactive to light.   Neck:      Thyroid: No thyromegaly.      Vascular: No JVD.      Trachea: No tracheal deviation.   Cardiovascular:      Rate and Rhythm: Normal rate and regular rhythm.      Heart sounds: Normal heart sounds.   Pulmonary:      Breath sounds: Normal breath sounds. No wheezing.   Abdominal:      General: Bowel sounds are normal. There is no distension.      Palpations: Abdomen is soft. Abdomen is not rigid. There is no mass.      Tenderness: There is no abdominal tenderness. There is no guarding or rebound.      Comments: Significantly obese   Musculoskeletal:         General: Normal range of motion.      Right lower leg: No edema.      Left lower leg: No edema.   Lymphadenopathy:      Cervical: No cervical adenopathy.   Skin:     General: Skin is warm and dry.      Findings: No erythema or rash.      Comments: Multiple tattoos   Neurological:      Mental Status: She is oriented to person, place, and time.   Psychiatric:         Mood and Affect: Mood normal.         Behavior: Behavior normal.         Thought Content: Thought content normal.         Judgment: Judgment normal.          Significant Labs:  I have reviewed all pertinent lab results within the past 24 hours.  CBC:   Recent Labs   Lab 10/25/23  0456   WBC 16.74*   RBC 4.94   HGB 10.5*   HCT 37.4      MCV 76*   MCH 21.3*   MCHC 28.1*     CMP:   Recent Labs   Lab 10/25/23  0456   *   CALCIUM 8.7   ALBUMIN 3.4*   PROT 8.3   *   K 4.3   CO2 20*      BUN 8   CREATININE 1.2   ALKPHOS 73   ALT 9*   AST 12   BILITOT 0.6     Coagulation: No results for input(s): "LABPROT", "INR", "APTT" in the " last 168 hours.    Significant Diagnostics:  I have reviewed all pertinent imaging results/findings within the past 24 hours.  Ultrasound was reviewed    Narrative & Impression  EXAMINATION:  US ABDOMEN LIMITED     CLINICAL HISTORY:  Right upper quadrant pain     COMPARISON:  None     FINDINGS:  The liver is appears enlarged measuring 18 cm.  No liver masses.  Multiple small gallstones.  Gallstones measure up to 7 mm.  No gallbladder wall thickening or tenderness.  Common bile duct is borderline prominent measuring 6.1 mm.  No intrahepatic bile duct dilatation     The portal vein is grossly normal.  The pancreas is normal.  The right kidney is normal at 10.8 cm.     No ascites.  The aorta and IVC are normal.  The adrenals are not included.     Impression:     Multiple gallstones.  No gallbladder wall thickening or tenderness.  Common bile duct is borderline prominent measuring 6.1 mm.  Correlation with liver function enzymes recommended.  Consider MRCP if bile duct obstruction is suspected.        Electronically signed by: Amandeep Rothman MD  Date:                                            10/24/2023    Assessment/Plan:     * Cholelithiasis with acute cholecystitis  Secondary to cholelithiasis.      Patient will benefit from a robotic cholecystectomy risks benefits and complications of surgery were discussed.  Wound complications are increased risk due to her morbid obesity    The risks, benefits and complications of robotic/laparoscopic cholecystectomy were discussed.  These included infection, bleeding, abscess, retained stone and bile leak.  The need for open conversion was dicussed.  The rare possibility of a common bile duct injury and the need for additional procedures and/or surgery was reviewed.  All question were answered.  The patient has agreed to proceed.  Consent was obtained.      Nausea and vomiting  Likely secondary to acute cholecystitis    Mitral valve disease  Management per hospital  "Medicine/Cardiology    Morbid obesity  Increases her risk of wound complications.      Patient should discuss weight loss options with her primary provider    Primary hypertension  Management per hospital Medicine    CHF (congestive heart failure)  Echocardiogram completed.      Transthoracic echo (TTE) complete    Height:  5' 3" (1.6 m)   Weight:  141.1 kg (311 lb)   Blood Pressure:  136/63    Date of Study:  10/24/23   Ordering Provider:  Tawanna Krishna MD   Clinical Indications:  CHF (congestive heart failure) [I50.9 (ICD-10-CM)]       Interpreting Physicians  Performing Staff    Radu Jung MD Tech:  Erika Garza [place]        Reason for Exam  Priority: STAT  Dx: CHF (congestive heart failure) [I50.9 (ICD-10-CM)]     View Images Vital Vitrea     Show images for Echo Saline Bubble? Yes  Summary         Left Ventricle: The left ventricle is normal in size. Normal wall thickness. There is concentric remodeling. Normal wall motion. There is normal systolic function with a visually estimated ejection fraction of 55 - 60%. There is normal diastolic function.    Right Ventricle: Normal right ventricular cavity size. Wall thickness is normal. Right ventricle wall motion  is normal. Systolic function is normal.    Tricuspid Valve: There is mild regurgitation with a centrally directed jet.    Pulmonary Artery: The estimated pulmonary artery systolic pressure is 22 mmHg.    IVC/SVC: Normal venous pressure at 3 mmHg.      Cardiology has seen the patient.      Patient has a low/moderate risk of a cardiac event        Vaibhav White MD  General Surgery  '[facility] (Hospital)  "

## 2023-10-25 NOTE — HOSPITAL COURSE
44 y/o female admitted with c/o right abdominal pain that radiates to her back with associated nausea and vomiting. US revealed multiple gallstones. General surgery consulted and plan for robotic cholecystectomy 10/26/23. She has a history of mitral valve tear on Entresto x 7 years, followed by Dr. Walsh OP. Cardiology was consulted for pre-op surgical clearance.   Echo with EF 55-60%, normal diastolic/systolic function, mild tricuspid regurgitation.   Has not been able to tolerate diet since admission d/t pain and nausea, not improved with pain medication regimen. She was started on Zosyn OV on admission, changed to PO Augmentin x 5 days on discharge.    On 10/25/23, Dr. White took her to the OR for robotic cholecystectomy and found the gallbladder acutely inflamed, edematous, and chronically thickened with stones.   Patient tolerated the procedure well. Laparoscopic incisions were C/D/I, no redness noted.   She was able to tolerate a diet prior to discharge and tenderness improved.     Prescriptions for Augmentin and Oakley sent to the inhouse pharmacy and filled prior to discharge.     Follow up with PCP in 3-5 days for hospital follow up.  Follow up with general surgery in 3 weeks for post op follow up.     Patient seen and examined on the day of discharge.  All questions and concerns were addressed prior to discharge.    Face to face encounter with patient: 34 minutes

## 2023-10-25 NOTE — ASSESSMENT & PLAN NOTE
-US shows multiple gallstones, no gallbladder wall thickening or tenderness, common bile duct is borderline prominent measuring 6.1 mm.   -general surgery consulted, plan for robotic oj today  -cont Zosyn IV  -IVF hydration  -NPO for surgery  -lipase 14  -pain control with IV/PO regimen  -anti-emetics PRN

## 2023-10-25 NOTE — PLAN OF CARE
Problem: Adult Inpatient Plan of Care  Goal: Plan of Care Review  Outcome: Ongoing, Progressing  Goal: Patient-Specific Goal (Individualized)  Outcome: Ongoing, Progressing  Goal: Absence of Hospital-Acquired Illness or Injury  Outcome: Ongoing, Progressing  Goal: Optimal Comfort and Wellbeing  Outcome: Ongoing, Progressing  Goal: Readiness for Transition of Care  Outcome: Ongoing, Progressing     Problem: Bariatric Environmental Safety  Goal: Safety Maintained with Care  Outcome: Ongoing, Progressing     Problem: Bleeding (Cholecystectomy)  Goal: Absence of Bleeding  Outcome: Ongoing, Progressing     Problem: Bowel Motility Impaired (Cholecystectomy)  Goal: Effective Bowel Elimination  Outcome: Ongoing, Progressing     Problem: Fluid and Electrolyte Imbalance (Cholecystectomy)  Goal: Fluid and Electrolyte Balance  Outcome: Ongoing, Progressing     Problem: Infection (Cholecystectomy)  Goal: Absence of Infection Signs and Symptoms  Outcome: Ongoing, Progressing     Problem: Ongoing Anesthesia Effects (Cholecystectomy)  Goal: Anesthesia/Sedation Recovery  Outcome: Ongoing, Progressing     Problem: Pain (Cholecystectomy)  Goal: Acceptable Pain Control  Outcome: Ongoing, Progressing     Problem: Postoperative Nausea and Vomiting (Cholecystectomy)  Goal: Nausea and Vomiting Relief  Outcome: Ongoing, Progressing     Problem: Postoperative Urinary Retention (Cholecystectomy)  Goal: Effective Urinary Elimination  Outcome: Ongoing, Progressing     Problem: Respiratory Compromise (Cholecystectomy)  Goal: Effective Oxygenation and Ventilation  Outcome: Ongoing, Progressing     Problem: Diabetes Comorbidity  Goal: Blood Glucose Level Within Targeted Range  Outcome: Ongoing, Progressing

## 2023-10-25 NOTE — PLAN OF CARE
[facility] - Telemetry (Hospital)  Initial Discharge Assessment       Primary Care Provider: Miles Pritchard MD    Admission Diagnosis: CHF (congestive heart failure) [I50.9]  Right upper quadrant abdominal pain [R10.11]  Calculus of gallbladder with acute cholecystitis without obstruction [K80.00]  Multiple gallstones [K80.20]    Admission Date: 10/24/2023  Expected Discharge Date:     Transition of Care Barriers: None    Payor: MEDICAID / Plan: HE[place] BLUE [place] / Product Type: Managed Medicaid /     Extended Emergency Contact Information  Primary Emergency Contact: Romi Boyd  Address: [address] [address] [facility]  Home Phone: [phone]  Mobile Phone: [phone]  Relation: Daughter  Secondary Emergency Contact: TrammellChantal  Mobile Phone: [phone]  Relation: Mother   needed? No    Discharge Plan A: Home with family         [employer] [id number] [place] [facility] [address] [place] [facility] [address] [facility] [address] [place] [address] [address] [place] [address] [address]  Phone: [phone] Fax: [phone]      Initial Assessment (most recent)       Adult Discharge Assessment - 10/25/23 1649          Discharge Assessment    Assessment Type Discharge Planning Assessment     Confirmed/corrected address, phone number and insurance Yes     Confirmed Demographics Correct on Facesheet     Source of Information family     Communicated VINAY with patient/caregiver Yes     Reason For Admission abdominal pain/colecystectomy     People in Home alone     Facility Arrived From: home     Do you expect to return to your current living situation? Other (see comments)   to mom's after surgery    Do you have help at home or someone to help you manage your care at home? Yes     Who are your caregiver(s) and their phone number(s)? mother     Prior to hospitilization cognitive status: Alert/Oriented     Current cognitive status: Alert/Oriented     Equipment  Currently Used at Home none     Readmission within 30 days? No     Patient currently being followed by outpatient case management? No     Do you currently have service(s) that help you manage your care at home? No     Do you take prescription medications? Yes     Do you have prescription coverage? Yes     Coverage Medicaid     Do you have any problems affording any of your prescribed medications? No     Is the patient taking medications as prescribed? yes     Who is going to help you get home at discharge? mother     Are you on dialysis? No     Do you take coumadin? No     DME Needed Upon Discharge  none     Discharge Plan discussed with: Parent(s)     Transition of Care Barriers None     Discharge Plan A Home with family                   Patient lives alone.  She is independent with ADL's.  She will go to her mother's home after discharge for care until she is back on her feet.  No discharge needs determined.    Mother asked about smoking cessation.  RT is to set up.  Patient declined nicotine patch.  Mother states patient wants [facility] cardiologist.  Will set up at discharge.

## 2023-10-25 NOTE — ASSESSMENT & PLAN NOTE
"Echocardiogram completed.      Transthoracic echo (TTE) complete    Height:  5' 3" (1.6 m)   Weight:  141.1 kg (311 lb)   Blood Pressure:  136/63    Date of Study:  10/24/23   Ordering Provider:  Tawanna Krishna MD   Clinical Indications:  CHF (congestive heart failure) [I50.9 (ICD-10-CM)]       Interpreting Physicians  Performing Staff    Radu Jung MD Tech:  Erika Garza [facility]        Reason for Exam  Priority: STAT  Dx: CHF (congestive heart failure) [I50.9 (ICD-10-CM)]     View Images Vital Vitrea     Show images for Echo Saline Bubble? Yes  Summary         Left Ventricle: The left ventricle is normal in size. Normal wall thickness. There is concentric remodeling. Normal wall motion. There is normal systolic function with a visually estimated ejection fraction of 55 - 60%. There is normal diastolic function.    Right Ventricle: Normal right ventricular cavity size. Wall thickness is normal. Right ventricle wall motion  is normal. Systolic function is normal.    Tricuspid Valve: There is mild regurgitation with a centrally directed jet.    Pulmonary Artery: The estimated pulmonary artery systolic pressure is 22 mmHg.    IVC/SVC: Normal venous pressure at 3 mmHg.      Cardiology has seen the patient.      Patient has a low/moderate risk of a cardiac event  "

## 2023-10-25 NOTE — ASSESSMENT & PLAN NOTE
Secondary to cholelithiasis.      Patient will benefit from a robotic cholecystectomy risks benefits and complications of surgery were discussed.  Wound complications are increased risk due to her morbid obesity    The risks, benefits and complications of robotic/laparoscopic cholecystectomy were discussed.  These included infection, bleeding, abscess, retained stone and bile leak.  The need for open conversion was dicussed.  The rare possibility of a common bile duct injury and the need for additional procedures and/or surgery was reviewed.  All question were answered.  The patient has agreed to proceed.  Consent was obtained.

## 2023-10-25 NOTE — PROGRESS NOTES
[facility] - Telemetry ([facility])  Colorectal Surgery  Progress Note    Patient Name: Vanessa Trammell  MRN: [id number]  Admission Date: 10/24/2023  Hospital Length of Stay: 0 days  Attending Physician: Meri Bergman MD    Subjective:     Interval History:  Patient reports persistent pain overnight.  Also has nausea and only tolerated a small amount of water intake.  Afebrile and denies chills    Post-Op Info:  Procedure(s) (LRB):  ROBOTIC CHOLECYSTECTOMY (N/A)          Medications:  Continuous Infusions:  Scheduled Meds:   carvediloL  25 mg Oral BID    enoxparin  60 mg Subcutaneous Q12H (prophylaxis, 0900/2100)    furosemide  40 mg Oral Daily    piperacillin-tazobactam (Zosyn) IV (PEDS and ADULTS) (extended infusion is not appropriate)  4.5 g Intravenous Q8H    sacubitriL-valsartan  1 tablet Oral BID     PRN Meds:   sodium chloride 0.9%    acetaminophen    dextrose 10%    dextrose 10%    glucagon (human recombinant)    glucose    glucose    HYDROmorphone    insulin aspart U-100    naloxone    ondansetron    oxyCODONE    promethazine    sodium chloride 0.9%        Objective:     Vital Signs (Most Recent):  Temp: 98.9 °F (37.2 °C) (10/25/23 0745)  Pulse: 86 (10/25/23 0745)  Resp: 18 (10/25/23 0937)  BP: (!) 107/56 (10/25/23 0745)  SpO2: 97 % (10/25/23 0745) Vital Signs (24h Range):  Temp:  [97.9 °F (36.6 °C)-98.9 °F (37.2 °C)] 98.9 °F (37.2 °C)  Pulse:  [72-86] 86  Resp:  [15-20] 18  SpO2:  [93 %-100 %] 97 %  BP: (107-165)/(55-82) 107/56     Intake/Output - Last 3 Shifts         10/23 0700  10/24 0659 10/24 0700  10/25 0659 10/25 0700  10/26 0659    I.V. (mL/kg)  2.4 (0)     IV Piggyback  96.7     Total Intake(mL/kg)  99.1 (0.7)     Net  +99.1            Urine Occurrence  2 x             Physical Exam  Constitutional:       Appearance: She is well-developed. She is obese.   HENT:      Head: Normocephalic and atraumatic.   Neck:      Thyroid: No thyromegaly.   Cardiovascular:      Rate and Rhythm: Normal rate and regular  rhythm.   Pulmonary:      Effort: Pulmonary effort is normal. No respiratory distress.   Abdominal:      Comments: Obese, TTP in RUQ with voluntary guarding   Musculoskeletal:         General: No tenderness. Normal range of motion.      Cervical back: Normal range of motion.   Skin:     General: Skin is warm and dry.      Capillary Refill: Capillary refill takes less than 2 seconds.   Neurological:      General: No focal deficit present.      Mental Status: She is alert and oriented to person, place, and time.       Significant Labs:  CBC:   Recent Labs   Lab 10/25/23  0456   WBC 16.74*   RBC 4.94   HGB 10.5*   HCT 37.4      MCV 76*   MCH 21.3*   MCHC 28.1*     CMP:   Recent Labs   Lab 10/25/23  0456   *   CALCIUM 8.7   ALBUMIN 3.4*   PROT 8.3   *   K 4.3   CO2 20*      BUN 8   CREATININE 1.2   ALKPHOS 73   ALT 9*   AST 12   BILITOT 0.6       Significant Diagnostics:  Echo: I have reviewed all pertinent results/findings within the past 24 hours:  retained EF    Assessment/Plan:     Active Diagnoses:    Diagnosis Date Noted POA    PRINCIPAL PROBLEM:  Cholelithiasis with acute cholecystitis [K80.00] 10/24/2023 Yes    Pre-op evaluation [Z01.818] 10/25/2023 Not Applicable    CHF (congestive heart failure) [I50.9] 10/24/2023 Yes    Primary hypertension [I10] 10/24/2023 Yes    Morbid obesity [E66.01] 10/24/2023 Yes    Mitral valve disease [I05.9] 10/24/2023 Yes    Nausea and vomiting [R11.2] 10/24/2023 Yes      Problems Resolved During this Admission:       - planning for robotic cholecystectomy today  - continue Zosyn preoperatively  - NPO with maintenance fluids  - echo reviewed and patient with preserved ejection fraction.  - potential discharge postoperatively    Tawanna Krishna MD  Colorectal Surgery  [facility] - Telemetry ([facility])

## 2023-10-25 NOTE — PROGRESS NOTES
[facility] Medicine  Progress Note    Patient Name: Vanessa Trammell  MRN: [id number]  Patient Class: OP- Observation   Admission Date: 10/24/2023  Length of Stay: 0 days  Attending Physician: Meri Bergman MD  Primary Care Provider: Miles Pritchard MD        Subjective:     Principal Problem:Cholelithiasis with acute cholecystitis      HPI:  42 y/o female with PMH of HTN, gallstones, CHF, mitral valve tear, and morbid obesity presented to ER 10/24/23 with c/o RUQ pain that radiates across her back on the right side with associated nausea and vomiting. She reports she has been having pain and gallstones since 2011, she has seen surgeon before and was told it wasn't bad enough for her to have it removed. She reports having attacks approximately 2 times a year and describes it as debilitating for her. She reports she does not eat fried, fatty, or red meat often; however, she had a burger last night that likely triggered her attack.  In ER, labs: H&H 11.0/37.7, CO 2 22, glucose 125. UA with +2 protein, preg test negative. CXR no acute finding. US abdomen with multiple gallstones, no gallbladder wall thickening or tenderness. Common bile duct is borderline prominent measuring 6.1 mm. Afebrile on admission. General surgery consulted and asked for [facility] to admit patient under observation for cholecystitis.       Overview/Hospital Course:  42 y/o female admitted with c/o right abdominal pain that radiates to her back with associated nausea and vomiting. US revealed multiple gallstones. General surgery consulted and plan for robotic cholecystectomy 10/26/23. She has a history of mitral valve tear on Entresto x 7 years, followed by Dr. Walsh OP. Cardiology was consulted for pre-op surgical clearance.   Echo with EF 55-60%, normal diastolic/systolic function, mild tricuspid regurgitation.   Has not been able to tolerate diet since admission d/t pain and nausea, not improved with pain medication  regimen.       Interval History: awake, alert, lying in bed, family at bedside, c/o pain not improved with pain medication. NPO for cholecystectomy today. VS stable.     Review of Systems  Objective:     Vital Signs (Most Recent):  Temp: 98.9 °F (37.2 °C) (10/25/23 0745)  Pulse: 86 (10/25/23 0745)  Resp: 18 (10/25/23 0937)  BP: (!) 107/56 (10/25/23 0745)  SpO2: 97 % (10/25/23 0745) Vital Signs (24h Range):  Temp:  [97.9 °F (36.6 °C)-98.9 °F (37.2 °C)] 98.9 °F (37.2 °C)  Pulse:  [72-86] 86  Resp:  [15-20] 18  SpO2:  [93 %-100 %] 97 %  BP: (107-165)/(55-82) 107/56     Weight: (!) 141.1 kg (311 lb)  Body mass index is 55.09 kg/m².    Intake/Output Summary (Last 24 hours) at 10/25/2023 1041  Last data filed at 10/24/2023 1800  Gross per 24 hour   Intake 99.08 ml   Output --   Net 99.08 ml         Physical Exam  Vitals and nursing note reviewed.   Constitutional:       Appearance: She is obese.   HENT:      Head: Normocephalic.   Eyes:      Pupils: Pupils are equal, round, and reactive to light.   Cardiovascular:      Rate and Rhythm: Normal rate and regular rhythm.      Heart sounds: No murmur heard.  Pulmonary:      Effort: Pulmonary effort is normal.      Breath sounds: Normal breath sounds.   Abdominal:      General: Bowel sounds are normal.      Palpations: Abdomen is soft.      Tenderness: There is abdominal tenderness (RUQ radiating to right flank and back).   Musculoskeletal:         General: Normal range of motion.   Skin:     General: Skin is warm and dry.   Neurological:      General: No focal deficit present.      Mental Status: She is alert and oriented to person, place, and time.   Psychiatric:         Mood and Affect: Mood normal.         Behavior: Behavior normal.             Significant Labs: All pertinent labs within the past 24 hours have been reviewed.  CBC:   Recent Labs   Lab 10/24/23  1128 10/25/23  0456   WBC 10.98 16.74*   HGB 11.0* 10.5*   HCT 37.7 37.4    360     CMP:   Recent Labs   Lab  10/24/23  1128 10/25/23  0456    134*   K 4.6 4.3    103   CO2 22* 20*   * 132*   BUN 8 8   CREATININE 1.2 1.2   CALCIUM 8.8 8.7   PROT 8.8* 8.3   ALBUMIN 3.8 3.4*   BILITOT 0.3 0.6   ALKPHOS 73 73   AST 13 12   ALT 12 9*   ANIONGAP 11 11       Significant Imaging: I have reviewed all pertinent imaging results/findings within the past 24 hours.      Assessment/Plan:      * Cholelithiasis with acute cholecystitis  -US shows multiple gallstones, no gallbladder wall thickening or tenderness, common bile duct is borderline prominent measuring 6.1 mm.   -general surgery consulted, plan for robotic oj today  -cont Zosyn IV  -IVF hydration  -NPO for surgery  -lipase 14  -pain control with IV/PO regimen  -anti-emetics PRN     Nausea and vomiting  -caused from above findings  -no emesis since this morning, but still reports nausea  -cont anti-emetics PRN    Mitral valve disease  -patient says she has a mitral valve tear, she takes Entresto and was told that it would repair it medically  -follows Dr. Walsh OP    Morbid obesity  Body mass index is 55.09 kg/m². Morbid obesity complicates all aspects of disease management from diagnostic modalities to treatment. Weight loss encouraged and health benefits explained to patient.    Primary hypertension  Chronic, controlled  -Latest blood pressure and vitals reviewed-   Temp:  [97.9 °F (36.6 °C)-98.9 °F (37.2 °C)]   Pulse:  [72-86]   Resp:  [15-20]   BP: (107-165)/(55-82)   SpO2:  [93 %-100 %] .   -Home meds for hypertension were reviewed and noted below.   Hypertension Medications             carvedilol (COREG) 25 MG tablet TK 1 T PO  BID    ENTRESTO 49-51 mg per tablet Take 1 tablet by mouth 2 (two) times daily.    furosemide (LASIX) 40 MG tablet TK 1 T PO QD        -While in the hospital, will manage blood pressure as follows; Continue home antihypertensive regimen  -optimize pain management    CHF (congestive heart failure)  Patient is identified as  "having Combined Systolic and Diastolic heart failure that is Chronic. CHF is currently controlled. Latest ECHO performed and demonstrates- Results for orders placed during the hospital encounter of 10/24/23    Echo Saline Bubble? Yes    Interpretation Summary    Left Ventricle: The left ventricle is normal in size. Normal wall thickness. There is concentric remodeling. Normal wall motion. There is normal systolic function with a visually estimated ejection fraction of 55 - 60%. There is normal diastolic function.    Right Ventricle: Normal right ventricular cavity size. Wall thickness is normal. Right ventricle wall motion  is normal. Systolic function is normal.    Tricuspid Valve: There is mild regurgitation with a centrally directed jet.    Pulmonary Artery: The estimated pulmonary artery systolic pressure is 22 mmHg.    IVC/SVC: Normal venous pressure at 3 mmHg.  . Continue Beta Blocker Furosemide and monitor clinical status closely. Monitor on telemetry. Patient is off CHF pathway.  Monitor strict Is&Os and daily weights.  Place on fluid restriction of 1.5 L. Continue to stress to patient importance of self efficacy and  on diet for CHF. Last BNP reviewed- and noted below No results for input(s): "BNP", "BNPTRIAGEBLO" in the last 168 hours..  -on Entresto for CHF and mitral valve tear  -primary cardiologist, Dr. Walsh  -cardiology consulted for cardiac clearance      VTE Risk Mitigation (From admission, onward)         Ordered     enoxaparin injection 60 mg  Every 12 hours         10/24/23 1513     IP VTE HIGH RISK PATIENT  Once         10/24/23 1512     Place sequential compression device  Until discontinued         10/24/23 1512                Discharge Planning   VINAY:      Code Status: Full Code   Is the patient medically ready for discharge?: No    Reason for patient still in hospital (select all that apply): Patient trending condition, Laboratory test and Treatment                 Tiffanie" CLAIRE Medrano NP  Department of Hospital Medicine   [facility] - Telemetry ([facility])

## 2023-10-25 NOTE — HPI
Ms. Trammell is a 43 year old female patient whose current medical conditions include morbid obesity, gallstones, HTN, CHF, and prior mitral valve tear who presented to [facility] ED yesterday with a chief complaint of RUQ pain associated with nausea/vomiting, similar to her prior gallbladder attacks in the past. She admitted to eating a burger prior to episode. She denied any associated SOB, chest pain, palpitations, near syncope, or syncope. U/S abdomen confirmed multiple gallstones with prominent bile duct and patient was subsequently admitted for further treatment of cholecystitis. Cardiology asked to see for pre-op evaluation. Patient seen and examined today resting in bed. Still complains of RUQ abdominal pain. No CV complaints. Remains chest pain free. No history of CAD or MI. Echo with normal EF, normal mitral valve function. EKG SR, non-specific T wave abnormality, no dynamic ischemic changes.

## 2023-10-25 NOTE — OP NOTE
[facility] - Surgery ([facility]  Surgery Department  Operative Note    SUMMARY     Date of Procedure: 10/25/2023     Procedure: Procedure(s) (LRB):  XI ROBOTIC CHOLECYSTECTOMY (N/A)     Surgeon(s) and Role:     * Vaibhav White MD - Primary    Assisting Surgeon: None    Pre-Operative Diagnosis: Calculus of gallbladder with acute cholecystitis without obstruction [K80.00]    Post-Operative Diagnosis: Post-Op Diagnosis Codes:     * Calculus of gallbladder with acute cholecystitis without obstruction [K80.00]    Anesthesia: General    Operative Findings (including complications, if any):     Acutely inflamed, edematous, chronically thickened gallbladder with stones    Description of Technical Procedures:       The patient was brought into the operating room and placed on the operating table in the supine position.  General endotracheal anesthesia was induced.  IV antibiotics were administered.  Pneumatic compression devices were placed in the lower extremities.  Arms were tucked at the side.  The abdomen was clipped, prepped and draped in the standard manner.   Indocyanine green was administered    A timeout was performed.    A small transverse incision was made just above the umbilicus.  The fascia was identified and elevated with [place] clamps.  A varies needle was inserted and its position confirmed by saline drop test.  Pneumoperitoneum was established to 15 mmHg.  A 8 mm robotic trocar was inserted.  The laparoscope was inserted.  There was no evidence of a vascular or enteric injury.    Additional trochars were placed as follows an 8 mm robotic trocar was placed in the midclavicular line in the left midabdomen.  An 8 mm trocar was placed in the midclavicular line of the right midabdomen.  An 8 mm trocar was placed in the anterior axillary line of the right midabdomen.  The patient was placed in reverse Trendelenburg position and rolled to the left.  The patient was docked to the da Morgan robot.    The omentum was  adherent to the gallbladder and this was swept down with the suction .  The gallbladder was then aspirated and 35 cc of clear bilious fluid was removed    The fundus of the gallbladder was retracted cephalad.  The infundibulum was retracted laterally.  The fire fly technology of the robot was activated in the course of the common bile duct was elucidated.    The cystic duct was not visualized and no dye could be seen entering the gallbladder    Peritoneum over the neck of the gallbladder was taken down with the hook cautery and the cystic duct was dissected circumferentially.  The cystic artery was then dissected circumferentially and the neck of the gallbladder was then dissected off the gallbladder bed.  This cleared Robbie triangle of all loose areolar tissue and the critical view was obtained.    Using the robotic firefly technology we could see the course of the common bile duct.  The cystic duct had a small amount of green visualization.  No green visualization of the gallbladder was seen    The cystic duct was clipped with 2 clips proximally 1 clip distally and transected.  The cystic artery was clipped with 2 clips proximally 1 clip distally and transected.  The robotic scissors with cauteryy was then used to remove the gallbladder from the gallbladder bed.     The gallbladder was nearly excised from the gallbladder bed, the gallbladder bed was inspected and hemostasis was ensured using electrocautery.  Removal of the gallbladder was then completed.    Fluid was irrigated from the right upper quadrant.    The right medial operating arm of the robot was then removed and an Endo Catch bag was inserted.  The gallbladder was placed in Endo Catch bag.  The patient was then undocked from the da Morgan operating robot.    The gallbladder was extracted through the right lateral incision.  The track was dilated to facilitate gallbladder removal.      The trocar site was then closed using 0 Vicryl ties using a  suture Passer x3.  The trochars were removed under direct vision and no bleeding was noted.  The abdomen was then desufflated.  Marcaine was infiltrated.  All incisions were closed with 4-0 Monocryl in a subcuticular manner.  Dermabond was placed    Final counts were correct      Significant Surgical Tasks Conducted by the Assistant(s), if Applicable:  No    Estimated Blood Loss (EBL):  20 mL   IV fluids approximately 1100 mL   Marcaine 0.25% 30 mL *           Implants: * No implants in log *    Specimens:   Specimen (24h ago, onward)       Start     Ordered    10/25/23 1444  Specimen to Pathology, Surgery General Surgery  Once        Comments: Pre-op Diagnosis: Calculus of gallbladder with acute cholecystitis without obstruction [K80.00]Procedure(s):XI ROBOTIC CHOLECYSTECTOMY Number of specimens: 1Name of specimens: 1. Gallbladder - PERM     References:    Click here for ordering Quick Tip   Question Answer Comment   Procedure Type: General Surgery    Specimen Class: Routine/Screening    Which provider would you like to cc? SAMANTHA RODRIGUEZ    Release to patient Immediate        10/25/23 1443                            Condition: Stable    Disposition: PACU - hemodynamically stable.    Attestation: I performed the procedure.

## 2023-10-25 NOTE — ANESTHESIA PROCEDURE NOTES
Intubation    Date/Time: 10/25/2023 1:22 PM    Performed by: Luan Stoll CRNA  Authorized by: Dimitris Persaud MD    Intubation:     Induction:  Intravenous    Intubated:  Postinduction    Mask Ventilation:  N/a    Attempts:  1    Attempted By:  CRNA    Method of Intubation:  Direct    Blade:  Silva 2    Laryngeal View Grade: Grade IIA - cords partially seen      Difficult Airway Encountered?: No      Complications:  None    Airway Device:  Oral endotracheal tube    Airway Device Size:  7.0    Style/Cuff Inflation:  Cuffed (inflated to minimal occlusive pressure)    Tube secured:  22    Secured at:  The lips    Placement Verified By:  Capnometry    Complicating Factors:  Large/floppy epiglottis, obesity, short neck, poor neck/head extension and oropharyngeal edema or fat    Findings Post-Intubation:  BS equal bilateral and atraumatic/condition of teeth unchanged

## 2023-10-25 NOTE — TRANSFER OF CARE
"Anesthesia Transfer of Care Note    Patient: Vanessa Trammell    Procedure(s) Performed: Procedure(s) (LRB):  XI ROBOTIC CHOLECYSTECTOMY (N/A)    Patient location: PACU    Anesthesia Type: general    Transport from OR: Transported from OR on room air with adequate spontaneous ventilation    Post pain: adequate analgesia    Post assessment: no apparent anesthetic complications    Post vital signs: stable    Level of consciousness: awake and responds to stimulation    Nausea/Vomiting: no nausea/vomiting    Complications: none    Transfer of care protocol was followed      Last vitals:   Visit Vitals  /64 (Patient Position: Lying)   Pulse 85   Temp 36.8 °C (98.2 °F) (Oral)   Resp 18   Ht 5' 3" (1.6 m)   Wt (!) 141.1 kg (311 lb)   SpO2 (!) 94%   Breastfeeding No   BMI 55.09 kg/m²     "

## 2023-10-25 NOTE — SUBJECTIVE & OBJECTIVE
Interval History: awake, alert, lying in bed, family at bedside, c/o pain not improved with pain medication. NPO for cholecystectomy today. VS stable.     Review of Systems  Objective:     Vital Signs (Most Recent):  Temp: 98.9 °F (37.2 °C) (10/25/23 0745)  Pulse: 86 (10/25/23 0745)  Resp: 18 (10/25/23 0937)  BP: (!) 107/56 (10/25/23 0745)  SpO2: 97 % (10/25/23 0745) Vital Signs (24h Range):  Temp:  [97.9 °F (36.6 °C)-98.9 °F (37.2 °C)] 98.9 °F (37.2 °C)  Pulse:  [72-86] 86  Resp:  [15-20] 18  SpO2:  [93 %-100 %] 97 %  BP: (107-165)/(55-82) 107/56     Weight: (!) 141.1 kg (311 lb)  Body mass index is 55.09 kg/m².    Intake/Output Summary (Last 24 hours) at 10/25/2023 1041  Last data filed at 10/24/2023 1800  Gross per 24 hour   Intake 99.08 ml   Output --   Net 99.08 ml         Physical Exam  Vitals and nursing note reviewed.   Constitutional:       Appearance: She is obese.   HENT:      Head: Normocephalic.   Eyes:      Pupils: Pupils are equal, round, and reactive to light.   Cardiovascular:      Rate and Rhythm: Normal rate and regular rhythm.      Heart sounds: No murmur heard.  Pulmonary:      Effort: Pulmonary effort is normal.      Breath sounds: Normal breath sounds.   Abdominal:      General: Bowel sounds are normal.      Palpations: Abdomen is soft.      Tenderness: There is abdominal tenderness (RUQ radiating to right flank and back).   Musculoskeletal:         General: Normal range of motion.   Skin:     General: Skin is warm and dry.   Neurological:      General: No focal deficit present.      Mental Status: She is alert and oriented to person, place, and time.   Psychiatric:         Mood and Affect: Mood normal.         Behavior: Behavior normal.             Significant Labs: All pertinent labs within the past 24 hours have been reviewed.  CBC:   Recent Labs   Lab 10/24/23  1128 10/25/23  0456   WBC 10.98 16.74*   HGB 11.0* 10.5*   HCT 37.7 37.4    360     CMP:   Recent Labs   Lab 10/24/23  1129  10/25/23  0456    134*   K 4.6 4.3    103   CO2 22* 20*   * 132*   BUN 8 8   CREATININE 1.2 1.2   CALCIUM 8.8 8.7   PROT 8.8* 8.3   ALBUMIN 3.8 3.4*   BILITOT 0.3 0.6   ALKPHOS 73 73   AST 13 12   ALT 12 9*   ANIONGAP 11 11       Significant Imaging: I have reviewed all pertinent imaging results/findings within the past 24 hours.

## 2023-10-25 NOTE — ASSESSMENT & PLAN NOTE
-patient says she has a mitral valve tear, she takes Entresto and was told that it would repair it medically  -follows Dr. Walsh OP

## 2023-10-25 NOTE — ANESTHESIA POSTPROCEDURE EVALUATION
Anesthesia Post Evaluation    Patient: Vanessa Trammell    Procedure(s) Performed: Procedure(s) (LRB):  XI ROBOTIC CHOLECYSTECTOMY (N/A)    Final Anesthesia Type: general      Patient location during evaluation: PACU  Patient participation: Yes- Able to Participate  Level of consciousness: awake and alert and oriented  Post-procedure vital signs: reviewed and stable  Pain management: adequate  Airway patency: patent  CAMILLE mitigation strategies: Verification of full reversal of neuromuscular block  PONV status at discharge: No PONV  Anesthetic complications: no      Cardiovascular status: blood pressure returned to baseline and hemodynamically stable  Respiratory status: unassisted  Hydration status: euvolemic  Follow-up not needed.          Vitals Value Taken Time   /58 10/25/23 1624   Temp 37.2 °C (98.9 °F) 10/25/23 1624   Pulse 84 10/25/23 1624   Resp 18 10/25/23 1624   SpO2 98 % 10/25/23 1624         Event Time   Out of Recovery 10/25/2023 16:10:00         Pain/Chin Score: Pain Rating Prior to Med Admin: 7 (10/25/2023  9:37 AM)  Pain Rating Post Med Admin: 3 (10/25/2023 10:07 AM)  Chin Score: 8 (10/25/2023  4:00 PM)

## 2023-10-25 NOTE — ASSESSMENT & PLAN NOTE
Chronic, controlled  -Latest blood pressure and vitals reviewed-   Temp:  [97.9 °F (36.6 °C)-98.9 °F (37.2 °C)]   Pulse:  [72-86]   Resp:  [15-20]   BP: (107-165)/(55-82)   SpO2:  [93 %-100 %] .   -Home meds for hypertension were reviewed and noted below.   Hypertension Medications             carvedilol (COREG) 25 MG tablet TK 1 T PO  BID    ENTRESTO 49-51 mg per tablet Take 1 tablet by mouth 2 (two) times daily.    furosemide (LASIX) 40 MG tablet TK 1 T PO QD        -While in the hospital, will manage blood pressure as follows; Continue home antihypertensive regimen  -optimize pain management

## 2023-10-25 NOTE — CONSULTS
[facility] - Telemetry ([facility])  Cardiology  Consult Note    Patient Name: Vanessa Trammell  MRN: [id number]  Admission Date: 10/24/2023  Hospital Length of Stay: 0 days  Code Status: Full Code   Attending Provider: Meri Bergman MD   Consulting Provider: Christa Pollack PA-C  Primary Care Physician: Miles Pritchard MD  Principal Problem:Cholelithiasis with acute cholecystitis    Patient information was obtained from patient, past medical records and ER records.     Inpatient consult to Cardiology  Consult performed by: Christa Pollack PA-C  Consult ordered by: Meri Bergman MD        Subjective:     Chief Complaint: Cholecystitis    HPI:   Ms. Trammell is a 43 year old female patient whose current medical conditions include morbid obesity, gallstones, HTN, CHF, and prior mitral valve tear who presented to [facility] ED yesterday with a chief complaint of RUQ pain associated with nausea/vomiting, similar to her prior gallbladder attacks in the past. She admitted to eating a burger prior to episode. She denied any associated SOB, chest pain, palpitations, near syncope, or syncope. U/S abdomen confirmed multiple gallstones with prominent bile duct and patient was subsequently admitted for further treatment of cholecystitis. Cardiology asked to see for pre-op evaluation. Patient seen and examined today resting in bed. Still complains of RUQ abdominal pain. No CV complaints. Remains chest pain free. No history of CAD or MI. Echo with normal EF, normal mitral valve function. EKG SR, non-specific T wave abnormality, no dynamic ischemic changes.       Past Medical History:   Diagnosis Date    CHF (congestive heart failure)     Gallstones     Hypertension     Mitral valve disease     Morbid obesity        Past Surgical History:   Procedure Laterality Date     SECTION      TUBAL LIGATION         Review of patient's allergies indicates:  No Known Allergies    No current facility-administered medications on file  prior to encounter.     Current Outpatient Medications on File Prior to Encounter   Medication Sig    aspirin (ECOTRIN) 81 MG EC tablet Take 1 tablet by mouth once daily.    atorvastatin (LIPITOR) 20 MG tablet TK 1 T PO ONCE A DAY AT NIGHT    carvedilol (COREG) 25 MG tablet TK 1 T PO  BID    ENTRESTO 49-51 mg per tablet Take 1 tablet by mouth 2 (two) times daily.    furosemide (LASIX) 40 MG tablet TK 1 T PO QD     Family History       Problem Relation (Age of Onset)    Hypertension Mother          Tobacco Use    Smoking status: Every Day    Smokeless tobacco: Not on file   Substance and Sexual Activity    Alcohol use: No    Drug use: No    Sexual activity: Not on file     Review of Systems   Constitutional: Positive for malaise/fatigue.   HENT: Negative.     Eyes: Negative.    Cardiovascular: Negative.    Respiratory: Negative.     Endocrine: Negative.    Hematologic/Lymphatic: Negative.    Skin: Negative.    Musculoskeletal: Negative.    Gastrointestinal:  Positive for abdominal pain (RUQ), nausea and vomiting.   Genitourinary: Negative.    Neurological: Negative.    Psychiatric/Behavioral: Negative.     Allergic/Immunologic: Negative.      Objective:     Vital Signs (Most Recent):  Temp: 98.9 °F (37.2 °C) (10/25/23 0745)  Pulse: 86 (10/25/23 0745)  Resp: 18 (10/25/23 0745)  BP: (!) 107/56 (10/25/23 0745)  SpO2: 97 % (10/25/23 0745) Vital Signs (24h Range):  Temp:  [97.9 °F (36.6 °C)-98.9 °F (37.2 °C)] 98.9 °F (37.2 °C)  Pulse:  [72-86] 86  Resp:  [15-20] 18  SpO2:  [93 %-100 %] 97 %  BP: (107-165)/(55-82) 107/56     Weight: (!) 141.1 kg (311 lb)  Body mass index is 55.09 kg/m².    SpO2: 97 %         Intake/Output Summary (Last 24 hours) at 10/25/2023 0936  Last data filed at 10/24/2023 1800  Gross per 24 hour   Intake 99.08 ml   Output --   Net 99.08 ml       Lines/Drains/Airways       Peripheral Intravenous Line  Duration                  Peripheral IV - Single Lumen 10/24/23 1132 20 G Right Antecubital  "<1 day                     Physical Exam  Vitals and nursing note reviewed.   Constitutional:       General: She is not in acute distress.     Appearance: She is well-developed. She is obese. She is not diaphoretic.   HENT:      Head: Normocephalic and atraumatic.   Eyes:      General:         Right eye: No discharge.         Left eye: No discharge.      Pupils: Pupils are equal, round, and reactive to light.   Cardiovascular:      Rate and Rhythm: Normal rate and regular rhythm.      Heart sounds: Normal heart sounds, S1 normal and S2 normal. No murmur heard.  Pulmonary:      Effort: Pulmonary effort is normal. No respiratory distress.      Breath sounds: Normal breath sounds. No wheezing or rales.   Abdominal:      General: There is no distension.      Tenderness: There is abdominal tenderness (RUQ). There is no rebound.   Musculoskeletal:      Right lower leg: No edema.      Left lower leg: No edema.   Skin:     General: Skin is warm and dry.      Findings: No erythema.   Neurological:      General: No focal deficit present.      Mental Status: She is alert and oriented to person, place, and time.   Psychiatric:         Mood and Affect: Mood normal.         Behavior: Behavior normal.         Thought Content: Thought content normal.        Significant Labs: CMP   Recent Labs   Lab 10/24/23  1128 10/25/23  0456    134*   K 4.6 4.3    103   CO2 22* 20*   * 132*   BUN 8 8   CREATININE 1.2 1.2   CALCIUM 8.8 8.7   PROT 8.8* 8.3   ALBUMIN 3.8 3.4*   BILITOT 0.3 0.6   ALKPHOS 73 73   AST 13 12   ALT 12 9*   ANIONGAP 11 11   , CBC   Recent Labs   Lab 10/24/23  1128 10/25/23  0456   WBC 10.98 16.74*   HGB 11.0* 10.5*   HCT 37.7 37.4    360   , Troponin No results for input(s): "TROPONINI" in the last 48 hours., and All pertinent lab results from the last 24 hours have been reviewed.    Significant Imaging: Echocardiogram: Transthoracic echo (TTE) complete (Cupid Only):   Results for orders placed " or performed during the hospital encounter of 10/24/23   Echo Saline Bubble? Yes   Result Value Ref Range    BSA 2.5 m2    LVOT stroke volume 69.28 cm3    LVIDd 4.78 3.5 - 6.0 cm    LV Systolic Volume 50.14 mL    LV Systolic Volume Index 21.5 mL/m2    LVIDs 3.48 2.1 - 4.0 cm    LV Diastolic Volume 106.38 mL    LV Diastolic Volume Index 45.66 mL/m2    IVS 1.18 (A) 0.6 - 1.1 cm    LVOT diameter 2.05 cm    LVOT area 3.3 cm2    FS 27 (A) 28 - 44 %    Left Ventricle Relative Wall Thickness 0.47 cm    Posterior Wall 1.12 (A) 0.6 - 1.1 cm    LV mass 205.01 g    LV Mass Index 88 g/m2    MV Peak E Chip 0.79 m/s    TDI LATERAL 0.12 m/s    TDI SEPTAL 0.09 m/s    E/E' ratio 7.52 m/s    MV Peak A Chip 0.62 m/s    TR Max Chip 2.17 m/s    E/A ratio 1.27     IVRT 125.59 msec    E wave deceleration time 210.23 msec    LV SEPTAL E/E' RATIO 8.78 m/s    LV LATERAL E/E' RATIO 6.58 m/s    LVOT peak chip 1.20 m/s    Left Ventricular Outflow Tract Mean Velocity 0.71 cm/s    Left Ventricular Outflow Tract Mean Gradient 2.39 mmHg    LA size 3.53 cm    Left Atrium Minor Axis 4.59 cm    Left Atrium Major Axis 4.81 cm    RVDD 3.35 cm    RVOT peak VTI 15.2 cm    TAPSE 2.67 cm    RA Major Axis 4.01 cm    AV mean gradient 4 mmHg    AV peak gradient 9 mmHg    Ao peak chip 1.47 m/s    Ao VTI 26.30 cm    LVOT peak VTI 21.00 cm    AV valve area 2.63 cm²    AV Velocity Ratio 0.82     AV index (prosthetic) 0.80     ALBERTINA by Velocity Ratio 2.69 cm²    Triscuspid Valve Regurgitation Peak Gradient 19 mmHg    PV mean gradient 1 mmHg    RVOT peak chip 0.63 m/s    Ao root annulus 2.82 cm    STJ 2.68 cm    Ascending aorta 3.18 cm    Mean e' 0.11 m/s    ZLVIDS -3.88     ZLVIDD -6.81     LA Volume Index 21.2 mL/m2    LA volume 49.33 cm3    LA WIDTH 3.5 cm    TV resting pulmonary artery pressure 22 mmHg    RV TB RVSP 5 mmHg    Est. RA pres 3 mmHg    Narrative      Left Ventricle: The left ventricle is normal in size. Normal wall   thickness. There is concentric  remodeling. Normal wall motion. There is   normal systolic function with a visually estimated ejection fraction of 55   - 60%. There is normal diastolic function.    Right Ventricle: Normal right ventricular cavity size. Wall thickness   is normal. Right ventricle wall motion  is normal. Systolic function is   normal.    Tricuspid Valve: There is mild regurgitation with a centrally directed   jet.    Pulmonary Artery: The estimated pulmonary artery systolic pressure is   22 mmHg.    IVC/SVC: Normal venous pressure at 3 mmHg.     , EKG: Reviewed, and X-Ray: CXR: X-Ray Chest 1 View (CXR): No results found for this visit on 10/24/23. and X-Ray Chest PA and Lateral (CXR): No results found for this visit on 10/24/23.    Assessment and Plan:   Patient who presents with acute cholecystitis. Stable CV wise. EF normal. No CV complaints. No cardiac contraindications to proceeding. Low-moderate risk of dennis and post OP CV complications.       * Cholelithiasis with acute cholecystitis  -General surgery on board, cholecystectomy planned    Pre-op evaluation  -Stable CV wise  -No CP/angina  -Echo with normal EF, normal mitral valve function  -EKG SR, non-specific T wave abnormality, no dynamic ischemic changes  -No cardiac contraindications to proceed with surgery. Low-moderate risk of dennis and post OP CV complications.    Mitral valve disease  -Normal valve function by echo    Morbid obesity  -Complicates all aspects of medical management  -Weight loss    Primary hypertension  -Continue home meds    CHF (congestive heart failure)  -Compensated  -Echo with normal EF  -Continue home regimen        VTE Risk Mitigation (From admission, onward)         Ordered     enoxaparin injection 60 mg  Every 12 hours         10/24/23 1513     IP VTE HIGH RISK PATIENT  Once         10/24/23 1512     Place sequential compression device  Until discontinued         10/24/23 1512                Thank you for your consult. I will follow-up with  patient. Please contact us if you have any additional questions.    Christa Pollack PA-C  Cardiology   [facility] - Telemetry ([facility])

## 2023-10-25 NOTE — ASSESSMENT & PLAN NOTE
"Patient is identified as having Combined Systolic and Diastolic heart failure that is Chronic. CHF is currently controlled. Latest ECHO performed and demonstrates- Results for orders placed during the hospital encounter of 10/24/23    Echo Saline Bubble? Yes    Interpretation Summary    Left Ventricle: The left ventricle is normal in size. Normal wall thickness. There is concentric remodeling. Normal wall motion. There is normal systolic function with a visually estimated ejection fraction of 55 - 60%. There is normal diastolic function.    Right Ventricle: Normal right ventricular cavity size. Wall thickness is normal. Right ventricle wall motion  is normal. Systolic function is normal.    Tricuspid Valve: There is mild regurgitation with a centrally directed jet.    Pulmonary Artery: The estimated pulmonary artery systolic pressure is 22 mmHg.    IVC/SVC: Normal venous pressure at 3 mmHg.  . Continue Beta Blocker Furosemide and monitor clinical status closely. Monitor on telemetry. Patient is off CHF pathway.  Monitor strict Is&Os and daily weights.  Place on fluid restriction of 1.5 L. Continue to stress to patient importance of self efficacy and  on diet for CHF. Last BNP reviewed- and noted below No results for input(s): "BNP", "BNPTRIAGEBLO" in the last 168 hours..  -on Entresto for CHF and mitral valve tear  -primary cardiologist, Dr. Walsh  -cardiology consulted for cardiac clearance  "

## 2023-10-26 VITALS
DIASTOLIC BLOOD PRESSURE: 65 MMHG | SYSTOLIC BLOOD PRESSURE: 118 MMHG | RESPIRATION RATE: 18 BRPM | HEIGHT: 63 IN | BODY MASS INDEX: 51.91 KG/M2 | HEART RATE: 99 BPM | OXYGEN SATURATION: 94 % | WEIGHT: 293 LBS | TEMPERATURE: 99 F

## 2023-10-26 LAB
ALBUMIN SERPL BCP-MCNC: 2.9 G/DL (ref 3.5–5.2)
ALP SERPL-CCNC: 59 U/L (ref 55–135)
ALT SERPL W/O P-5'-P-CCNC: 13 U/L (ref 10–44)
ANION GAP SERPL CALC-SCNC: 13 MMOL/L (ref 8–16)
AST SERPL-CCNC: 19 U/L (ref 10–40)
BILIRUB SERPL-MCNC: 0.4 MG/DL (ref 0.1–1)
BUN SERPL-MCNC: 10 MG/DL (ref 6–20)
CALCIUM SERPL-MCNC: 8.1 MG/DL (ref 8.7–10.5)
CHLORIDE SERPL-SCNC: 105 MMOL/L (ref 95–110)
CO2 SERPL-SCNC: 18 MMOL/L (ref 23–29)
CREAT SERPL-MCNC: 1.4 MG/DL (ref 0.5–1.4)
ERYTHROCYTE [DISTWIDTH] IN BLOOD BY AUTOMATED COUNT: 18.9 % (ref 11.5–14.5)
EST. GFR  (NO RACE VARIABLE): 47 ML/MIN/1.73 M^2
GLUCOSE SERPL-MCNC: 110 MG/DL (ref 70–110)
HCT VFR BLD AUTO: 31.1 % (ref 37–48.5)
HGB BLD-MCNC: 8.9 G/DL (ref 12–16)
MCH RBC QN AUTO: 21.5 PG (ref 27–31)
MCHC RBC AUTO-ENTMCNC: 28.6 G/DL (ref 32–36)
MCV RBC AUTO: 75 FL (ref 82–98)
PLATELET # BLD AUTO: 311 K/UL (ref 150–450)
PMV BLD AUTO: 9.9 FL (ref 9.2–12.9)
POCT GLUCOSE: 105 MG/DL (ref 70–110)
POTASSIUM SERPL-SCNC: 4 MMOL/L (ref 3.5–5.1)
PROT SERPL-MCNC: 7.1 G/DL (ref 6–8.4)
RBC # BLD AUTO: 4.14 M/UL (ref 4–5.4)
SODIUM SERPL-SCNC: 136 MMOL/L (ref 136–145)
WBC # BLD AUTO: 14.04 K/UL (ref 3.9–12.7)

## 2023-10-26 PROCEDURE — 85027 COMPLETE CBC AUTOMATED: CPT | Performed by: SURGERY

## 2023-10-26 PROCEDURE — 27000221 HC OXYGEN, UP TO 24 HOURS

## 2023-10-26 PROCEDURE — 25000003 PHARM REV CODE 250: Performed by: SURGERY

## 2023-10-26 PROCEDURE — 63600175 PHARM REV CODE 636 W HCPCS: Performed by: SURGERY

## 2023-10-26 PROCEDURE — 96366 THER/PROPH/DIAG IV INF ADDON: CPT

## 2023-10-26 PROCEDURE — 94761 N-INVAS EAR/PLS OXIMETRY MLT: CPT

## 2023-10-26 PROCEDURE — 94799 UNLISTED PULMONARY SVC/PX: CPT

## 2023-10-26 PROCEDURE — 80053 COMPREHEN METABOLIC PANEL: CPT | Performed by: SURGERY

## 2023-10-26 PROCEDURE — 36415 COLL VENOUS BLD VENIPUNCTURE: CPT | Performed by: SURGERY

## 2023-10-26 RX ORDER — AMOXICILLIN AND CLAVULANATE POTASSIUM 875; 125 MG/1; MG/1
1 TABLET, FILM COATED ORAL EVERY 12 HOURS
Status: DISCONTINUED | OUTPATIENT
Start: 2023-10-26 | End: 2023-10-26 | Stop reason: HOSPADM

## 2023-10-26 RX ORDER — HYDROCODONE BITARTRATE AND ACETAMINOPHEN 5; 325 MG/1; MG/1
1 TABLET ORAL EVERY 6 HOURS PRN
Qty: 15 TABLET | Refills: 0 | Status: SHIPPED | OUTPATIENT
Start: 2023-10-26 | End: 2023-11-13

## 2023-10-26 RX ORDER — AMOXICILLIN AND CLAVULANATE POTASSIUM 875; 125 MG/1; MG/1
1 TABLET, FILM COATED ORAL EVERY 12 HOURS
Qty: 10 TABLET | Refills: 0 | Status: SHIPPED | OUTPATIENT
Start: 2023-10-26 | End: 2023-10-31

## 2023-10-26 RX ADMIN — AMOXICILLIN AND CLAVULANATE POTASSIUM 1 TABLET: 875; 125 TABLET, FILM COATED ORAL at 08:10

## 2023-10-26 RX ADMIN — OXYCODONE HYDROCHLORIDE 5 MG: 5 TABLET ORAL at 02:10

## 2023-10-26 RX ADMIN — CHLORHEXIDINE GLUCONATE 0.12% ORAL RINSE 10 ML: 1.2 LIQUID ORAL at 08:10

## 2023-10-26 RX ADMIN — CARVEDILOL 25 MG: 12.5 TABLET, FILM COATED ORAL at 08:10

## 2023-10-26 RX ADMIN — PIPERACILLIN SODIUM AND TAZOBACTAM SODIUM 4.5 G: 4; .5 INJECTION, POWDER, FOR SOLUTION INTRAVENOUS at 12:10

## 2023-10-26 RX ADMIN — OXYCODONE HYDROCHLORIDE 10 MG: 5 TABLET ORAL at 08:10

## 2023-10-26 NOTE — PLAN OF CARE
Problem: Adult Inpatient Plan of Care  Goal: Plan of Care Review  Outcome: Met  Goal: Patient-Specific Goal (Individualized)  Outcome: Met  Goal: Absence of Hospital-Acquired Illness or Injury  Outcome: Met  Goal: Optimal Comfort and Wellbeing  Outcome: Met  Goal: Readiness for Transition of Care  Outcome: Met     Problem: Bariatric Environmental Safety  Goal: Safety Maintained with Care  Outcome: Met     Problem: Bleeding (Cholecystectomy)  Goal: Absence of Bleeding  Outcome: Met     Problem: Bowel Motility Impaired (Cholecystectomy)  Goal: Effective Bowel Elimination  Outcome: Met     Problem: Fluid and Electrolyte Imbalance (Cholecystectomy)  Goal: Fluid and Electrolyte Balance  Outcome: Met     Problem: Fluid and Electrolyte Imbalance (Cholecystectomy)  Goal: Fluid and Electrolyte Balance  Outcome: Met     Problem: Infection (Cholecystectomy)  Goal: Absence of Infection Signs and Symptoms  Outcome: Met     Problem: Ongoing Anesthesia Effects (Cholecystectomy)  Goal: Anesthesia/Sedation Recovery  Outcome: Met     Problem: Pain (Cholecystectomy)  Goal: Acceptable Pain Control  Outcome: Met     Problem: Postoperative Nausea and Vomiting (Cholecystectomy)  Goal: Nausea and Vomiting Relief  Outcome: Met     Problem: Postoperative Nausea and Vomiting (Cholecystectomy)  Goal: Nausea and Vomiting Relief  Outcome: Met     Problem: Postoperative Urinary Retention (Cholecystectomy)  Goal: Effective Urinary Elimination  Outcome: Met     Problem: Respiratory Compromise (Cholecystectomy)  Goal: Effective Oxygenation and Ventilation  Outcome: Met     Problem: Diabetes Comorbidity  Goal: Blood Glucose Level Within Targeted Range  Outcome: Met

## 2023-10-26 NOTE — ASSESSMENT & PLAN NOTE
Body mass index is 58.34 kg/m². Morbid obesity complicates all aspects of disease management from diagnostic modalities to treatment. Weight loss encouraged and health benefits explained to patient.

## 2023-10-26 NOTE — ASSESSMENT & PLAN NOTE
Robotic cholecystectomy 1026   White cell decreasing   Liver function tests normal   Low-fat diet   Convert to Augmentin   Discharge per hospital Medicine with 5 days of Augmentin and a narcotic pain medicine.  Follow-up in 3 weeks with General surgery, office will schedule appointment.

## 2023-10-26 NOTE — ASSESSMENT & PLAN NOTE
-patient says she has a mitral valve tear, she takes Entresto and was told that it would repair it medically  -Echo WNL  -follows Dr. Walsh OP

## 2023-10-26 NOTE — SUBJECTIVE & OBJECTIVE
Interval History:   Less right upper quadrant pain.  Incisional pain.  White count is decreasing.  Liver function tests normal.  Augmentin, low-fat diet, anticipate discharge by hospital Medicine    Medications:  Continuous Infusions:   lactated ringers 80 mL/hr at 10/25/23 1758     Scheduled Meds:   amoxicillin-clavulanate 875-125mg  1 tablet Oral Q12H    carvediloL  25 mg Oral BID    chlorhexidine  10 mL Mouth/Throat BID    enoxparin  60 mg Subcutaneous Q12H (prophylaxis, 0900/2100)    furosemide  40 mg Oral Daily    sacubitriL-valsartan  1 tablet Oral BID     PRN Meds:acetaminophen, dextrose 10%, dextrose 10%, diphenhydrAMINE, glucagon (human recombinant), glucose, glucose, HYDROmorphone, HYDROmorphone, insulin aspart U-100, metoclopramide HCl, naloxone, ondansetron, ondansetron, oxyCODONE, oxyCODONE, promethazine, sodium chloride 0.9%     Review of patient's allergies indicates:  No Known Allergies  Objective:     Vital Signs (Most Recent):  Temp: 99 °F (37.2 °C) (10/26/23 0833)  Pulse: 99 (10/26/23 0833)  Resp: 18 (10/26/23 0833)  BP: 118/65 (10/26/23 0833)  SpO2: (!) 94 % (10/26/23 0833) Vital Signs (24h Range):  Temp:  [98 °F (36.7 °C)-99.3 °F (37.4 °C)] 99 °F (37.2 °C)  Pulse:  [79-99] 99  Resp:  [16-23] 18  SpO2:  [89 %-100 %] 94 %  BP: ()/(46-65) 118/65     Weight: (!) 149.4 kg (329 lb 5.9 oz)  Body mass index is 58.34 kg/m².    Intake/Output - Last 3 Shifts         10/24 0700  10/25 0659 10/25 0700  10/26 0659 10/26 0700  10/27 0659    I.V. (mL/kg) 2.4 (0)      IV Piggyback 96.7 1000     Total Intake(mL/kg) 99.1 (0.7) 1000 (6.7)     Net +99.1 +1000            Urine Occurrence 2 x               Physical Exam  Vitals and nursing note reviewed.   Constitutional:       Appearance: She is well-developed. She is obese.   HENT:      Head: Normocephalic.   Eyes:      General: No scleral icterus.     Pupils: Pupils are equal, round, and reactive to light.   Neck:      Thyroid: No thyromegaly.      Vascular:  No JVD.      Trachea: No tracheal deviation.   Cardiovascular:      Rate and Rhythm: Normal rate and regular rhythm.      Heart sounds: Normal heart sounds.   Pulmonary:      Breath sounds: Normal breath sounds. No wheezing.   Abdominal:      General: Bowel sounds are normal. There is no distension.      Palpations: Abdomen is soft. Abdomen is not rigid. There is no mass.      Tenderness: There is no abdominal tenderness. There is no guarding or rebound.      Comments: Massively obese.  Incisions are clean.  Slight indention of extraction site incision   Musculoskeletal:         General: Normal range of motion.      Right lower leg: No edema.      Left lower leg: No edema.   Lymphadenopathy:      Cervical: No cervical adenopathy.   Skin:     General: Skin is warm and dry.      Findings: No erythema or rash.   Neurological:      Mental Status: She is oriented to person, place, and time.   Psychiatric:         Mood and Affect: Mood normal.         Behavior: Behavior normal.         Thought Content: Thought content normal.         Judgment: Judgment normal.          Significant Labs:  I have reviewed all pertinent lab results within the past 24 hours.  CBC:   Recent Labs   Lab 10/26/23  0505   WBC 14.04*   RBC 4.14   HGB 8.9*   HCT 31.1*      MCV 75*   MCH 21.5*   MCHC 28.6*     CMP:   Recent Labs   Lab 10/26/23  0505      CALCIUM 8.1*   ALBUMIN 2.9*   PROT 7.1      K 4.0   CO2 18*      BUN 10   CREATININE 1.4   ALKPHOS 59   ALT 13   AST 19   BILITOT 0.4       Significant Diagnostics:  I have reviewed all pertinent imaging results/findings within the past 24 hours.  No new

## 2023-10-26 NOTE — ASSESSMENT & PLAN NOTE
-US shows multiple gallstones, no gallbladder wall thickening or tenderness, common bile duct is borderline prominent measuring 6.1 mm.   -s/p robotic oj 10/25 by Dr. White  -cont Zosyn IV, switch to Augmentin x 5 days on d/c  -IVF hydration  -NPO for surgery  -lipase 14  -pain control with IV/PO regimen  -anti-emetics PRN

## 2023-10-26 NOTE — DISCHARGE SUMMARY
[facility]  [facility]charge Summary      Patient Name: Vanessa Trammell  MRN: [id number]  ABDULKADIR: [id number]  Patient Class: OP- Outpatient Recovery  Admission Date: 10/24/2023  Hospital Length of Stay: 0 days  Discharge Date and Time: 10/26/2023 11:19 AM  Attending Physician: Meri Bergman MD  Discharging Provider: Tiffanie Medrano NP  Primary Care Provider: Miles Pritchard MD    Primary Care Team: Networked reference to record PCT     HPI:   42 y/o female with PMH of HTN, gallstones, CHF, mitral valve tear, and morbid obesity presented to ER 10/24/23 with c/o RUQ pain that radiates across her back on the right side with associated nausea and vomiting. She reports she has been having pain and gallstones since 2011, she has seen surgeon before and was told it wasn't bad enough for her to have it removed. She reports having attacks approximately 2 times a year and describes it as debilitating for her. She reports she does not eat fried, fatty, or red meat often; however, she had a burger last night that likely triggered her attack.  In ER, labs: H&H 11.0/37.7, CO 2 22, glucose 125. UA with +2 protein, preg test negative. CXR no acute finding. US abdomen with multiple gallstones, no gallbladder wall thickening or tenderness. Common bile duct is borderline prominent measuring 6.1 mm. Afebrile on admission. General surgery consulted and [place] [facility] [place] observation for cholecystitis.         Procedure(s) (LRB):  XI ROBOTIC CHOLECYSTECTOMY (N/A)      Hospital Course:   42 y/o female admitted with c/o right abdominal pain that radiates to her back with associated nausea and vomiting. US revealed multiple gallstones. General surgery consulted and plan for robotic cholecystectomy 10/26/23. She has a history of mitral valve tear on Entresto x 7 years, followed by Dr. Walsh OP. Cardiology was consulted for pre-op surgical clearance.   Echo with EF 55-60%, normal  diastolic/systolic function, mild tricuspid regurgitation.   Has not been able to tolerate diet since admission d/t pain and nausea, not improved with pain medication regimen. She was started on Zosyn OV on admission, changed to PO Augmentin x 5 days on discharge.    On 10/25/23, Dr. White took her to the OR for robotic cholecystectomy and found the gallbladder acutely inflamed, edematous, and chronically thickened with stones.   Patient tolerated the procedure well. Laparoscopic incisions were C/D/I, no redness noted.   She was able to tolerate a diet prior to discharge and tenderness improved.     Prescriptions for Augmentin and [place] sent to the inhouse pharmacy and filled prior to discharge.     Follow up with PCP in 3-5 days for hospital follow up.  Follow up with general surgery in 3 weeks for post op follow up.     Patient seen and examined on the day of discharge.  All questions and concerns were addressed prior to discharge.    Face to face encounter with patient: 34 minutes         Goals of Care Treatment Preferences:  Code Status: Full Code      Consults:   Consults (From admission, onward)        Status Ordering Provider     Inpatient consult to Cardiology  Once        Provider:  Radu Jung MD    Completed JOY FOWLER          Cardiac/Vascular  Mitral valve disease  -patient says she has a mitral valve tear, she takes Entresto and was told that it would repair it medically  -Echo WNL  -follows Dr. Walsh OP    Primary hypertension  Chronic, controlled  -Latest blood pressure and vitals reviewed-   Temp:  [98 °F (36.7 °C)-99.3 °F (37.4 °C)]   Pulse:  [79-99]   Resp:  [16-18]   BP: ()/(48-65)   SpO2:  [93 %-98 %] .   -Home meds for hypertension were reviewed and noted below.   Hypertension Medications             carvedilol (COREG) 25 MG tablet TK 1 T PO  BID    ENTRESTO 49-51 mg per tablet Take 1 tablet by mouth 2 (two) times daily.    furosemide (LASIX) 40 MG tablet TK 1 T PO QD  "       -While in the hospital, will manage blood pressure as follows; Continue home antihypertensive regimen  -optimize pain management    CHF (congestive heart failure)  Patient is identified as having Combined Systolic and Diastolic heart failure that is Chronic. CHF is currently controlled. Latest ECHO performed and demonstrates- Results for orders placed during the hospital encounter of 10/24/23    Echo Saline Bubble? Yes    Interpretation Summary    Left Ventricle: The left ventricle is normal in size. Normal wall thickness. There is concentric remodeling. Normal wall motion. There is normal systolic function with a visually estimated ejection fraction of 55 - 60%. There is normal diastolic function.    Right Ventricle: Normal right ventricular cavity size. Wall thickness is normal. Right ventricle wall motion  is normal. Systolic function is normal.    Tricuspid Valve: There is mild regurgitation with a centrally directed jet.    Pulmonary Artery: The estimated pulmonary artery systolic pressure is 22 mmHg.    IVC/SVC: Normal venous pressure at 3 mmHg.  . Continue Beta Blocker Furosemide and monitor clinical status closely. Monitor on telemetry. Patient is off CHF pathway.  Monitor strict Is&Os and daily weights.  Place on fluid restriction of 1.5 L. Continue to stress to patient importance of self efficacy and  on diet for CHF. Last BNP reviewed- and noted below No results for input(s): "BNP", "BNPTRIAGEBLO" in the last 168 hours..  -on Entresto for CHF and mitral valve tear  -primary cardiologist, Dr. Walsh  -cardiology consulted for cardiac clearance    Endocrine  Morbid obesity  Body mass index is 58.34 kg/m². Morbid obesity complicates all aspects of disease management from diagnostic modalities to treatment. Weight loss encouraged and health benefits explained to patient.    GI  * Cholelithiasis with acute cholecystitis  -US shows multiple gallstones, no gallbladder wall thickening or " tenderness, common bile duct is borderline prominent measuring 6.1 mm.   -s/p robotic oj 10/25 by Dr. White  -cont Zosyn IV, switch to Augmentin x 5 days on d/c  -IVF hydration  -NPO for surgery  -lipase 14  -pain control with IV/PO regimen  -anti-emetics PRN     Nausea and vomiting  -caused from above findings  -no further nausea or emesis post op  -cont anti-emetics PRN    Final Active Diagnoses:    Diagnosis Date Noted POA    PRINCIPAL PROBLEM:  Cholelithiasis with acute cholecystitis [K80.00] 10/24/2023 Yes    Nausea and vomiting [R11.2] 10/24/2023 Yes    CHF (congestive heart failure) [I50.9] 10/24/2023 Yes    Primary hypertension [I10] 10/24/2023 Yes    Morbid obesity [E66.01] 10/24/2023 Yes    Mitral valve disease [I05.9] 10/24/2023 Yes      Problems Resolved During this Admission:       Discharged Condition: good    Disposition: Home or Self Care    Follow Up:   Follow-up Information     Vaibhav White MD Follow up in 3 week(s).    Specialty: General Surgery  Why: post op jinny,t cholecystectomy  Contact information:  [address]  [place] [address] [phone]             Miles Pritchard MD. Schedule an appointment as soon as possible for a visit in 3 day(s).    Specialty: Family Medicine  Why: call office and schedule hospital follow up in 3-5 days  Contact information:  [address]  [facility] [address] [phone]                       Patient Instructions:      Diet Cardiac     Activity as tolerated       Significant Diagnostic Studies: Labs: CMP   Recent Labs   Lab 10/25/23  0456 10/26/23  0505   * 136   K 4.3 4.0    105   CO2 20* 18*   * 110   BUN 8 10   CREATININE 1.2 1.4   CALCIUM 8.7 8.1*   PROT 8.3 7.1   ALBUMIN 3.4* 2.9*   BILITOT 0.6 0.4   ALKPHOS 73 59   AST 12 19   ALT 9* 13   ANIONGAP 11 13    and CBC   Recent Labs   Lab 10/25/23  0456 10/26/23  0505   WBC 16.74* 14.04*   HGB 10.5* 8.9*   HCT 37.4 31.1*    311        Pending Diagnostic Studies:     Procedure Component Value Units Date/Time    Specimen to Pathology, Surgery General Surgery [[id number]] Collected: 10/25/23 1444    Order Status: Sent Lab Status: In process Updated: 10/26/23 0819    Specimen: Tissue          Medications:  Reconciled Home Medications:      Medication List      START taking these medications    amoxicillin-clavulanate 875-125mg 875-125 mg per tablet  Commonly known as: AUGMENTIN  Take 1 tablet by mouth every 12 (twelve) hours. for 5 days     HYDROcodone-acetaminophen 5-325 mg per tablet  Commonly known as: NORCO  Take 1 tablet by mouth every 6 (six) hours as needed for Pain.        CONTINUE taking these medications    aspirin 81 MG EC tablet  Commonly known as: ECOTRIN  Take 1 tablet by mouth once daily.     atorvastatin 20 MG tablet  Commonly known as: LIPITOR  TK 1 T PO ONCE A DAY AT NIGHT     carvediloL 25 MG tablet  Commonly known as: COREG  TK 1 T PO  BID     ENTRESTO 49-51 mg per tablet  Generic drug: sacubitriL-valsartan  Take 1 tablet by mouth 2 (two) times daily.     furosemide 40 MG tablet  Commonly known as: LASIX  TK 1 T PO QD            Indwelling Lines/Drains at time of discharge:   Lines/Drains/Airways       None                   Time spent on the discharge of patient: 45 minutes         Tiffanie Medrano NP  Department of Hospital Medicine  [facility] ([facility])

## 2023-10-26 NOTE — DISCHARGE INSTRUCTIONS
Please call for any fever, increase in pain, nausea or vomiting or redness or drainage from incision(s).    No lifting more than 30 pounds for 2 weeks    No driving if taking the pain medicine    May shower     Removed the glue when it becomes loose    If you become constipated from the pain medication you can use over the counter laxatives,  Miralax or Glycolax, or milk of magnesia for severe constipation.    Our office phone numbers are  [phone] and     Our office fax  number is #[phone]    If the office has not contacted you with a postop appointment by Monday please call and make sure you talk to someone in surgery.  Explained to them that you had your gallbladder removed and you need to have a postoperative surgical appointment made for you

## 2023-10-26 NOTE — ASSESSMENT & PLAN NOTE
Chronic, controlled  -Latest blood pressure and vitals reviewed-   Temp:  [98 °F (36.7 °C)-99.3 °F (37.4 °C)]   Pulse:  [79-99]   Resp:  [16-18]   BP: ()/(48-65)   SpO2:  [93 %-98 %] .   -Home meds for hypertension were reviewed and noted below.   Hypertension Medications             carvedilol (COREG) 25 MG tablet TK 1 T PO  BID    ENTRESTO 49-51 mg per tablet Take 1 tablet by mouth 2 (two) times daily.    furosemide (LASIX) 40 MG tablet TK 1 T PO QD        -While in the hospital, will manage blood pressure as follows; Continue home antihypertensive regimen  -optimize pain management

## 2023-10-26 NOTE — PROGRESS NOTES
[facility] - Telemetry ([facility])  General Surgery  Progress Note    Subjective:     History of Present Illness:  No notes on file    Post-Op Info:  Procedure(s) (LRB):  XI ROBOTIC CHOLECYSTECTOMY (N/A)   1 Day Post-Op     Interval History:   Less right upper quadrant pain.  Incisional pain.  White count is decreasing.  Liver function tests normal.  Augmentin, low-fat diet, anticipate discharge by hospital Medicine    Medications:  Continuous Infusions:   lactated ringers 80 mL/hr at 10/25/23 1758     Scheduled Meds:   amoxicillin-clavulanate 875-125mg  1 tablet Oral Q12H    carvediloL  25 mg Oral BID    chlorhexidine  10 mL Mouth/Throat BID    enoxparin  60 mg Subcutaneous Q12H (prophylaxis, 0900/2100)    furosemide  40 mg Oral Daily    sacubitriL-valsartan  1 tablet Oral BID     PRN Meds:acetaminophen, dextrose 10%, dextrose 10%, diphenhydrAMINE, glucagon (human recombinant), glucose, glucose, HYDROmorphone, HYDROmorphone, insulin aspart U-100, metoclopramide HCl, naloxone, ondansetron, ondansetron, oxyCODONE, oxyCODONE, promethazine, sodium chloride 0.9%     Review of patient's allergies indicates:  No Known Allergies  Objective:     Vital Signs (Most Recent):  Temp: 99 °F (37.2 °C) (10/26/23 0833)  Pulse: 99 (10/26/23 0833)  Resp: 18 (10/26/23 0833)  BP: 118/65 (10/26/23 0833)  SpO2: (!) 94 % (10/26/23 0833) Vital Signs (24h Range):  Temp:  [98 °F (36.7 °C)-99.3 °F (37.4 °C)] 99 °F (37.2 °C)  Pulse:  [79-99] 99  Resp:  [16-23] 18  SpO2:  [89 %-100 %] 94 %  BP: ()/(46-65) 118/65     Weight: (!) 149.4 kg (329 lb 5.9 oz)  Body mass index is 58.34 kg/m².    Intake/Output - Last 3 Shifts         10/24 0700  10/25 0659 10/25 0700  10/26 0659 10/26 0700  10/27 0659    I.V. (mL/kg) 2.4 (0)      IV Piggyback 96.7 1000     Total Intake(mL/kg) 99.1 (0.7) 1000 (6.7)     Net +99.1 +1000            Urine Occurrence 2 x               Physical Exam  Vitals and nursing note reviewed.   Constitutional:       Appearance:  She is well-developed. She is obese.   HENT:      Head: Normocephalic.   Eyes:      General: No scleral icterus.     Pupils: Pupils are equal, round, and reactive to light.   Neck:      Thyroid: No thyromegaly.      Vascular: No JVD.      Trachea: No tracheal deviation.   Cardiovascular:      Rate and Rhythm: Normal rate and regular rhythm.      Heart sounds: Normal heart sounds.   Pulmonary:      Breath sounds: Normal breath sounds. No wheezing.   Abdominal:      General: Bowel sounds are normal. There is no distension.      Palpations: Abdomen is soft. Abdomen is not rigid. There is no mass.      Tenderness: There is no abdominal tenderness. There is no guarding or rebound.      Comments: Massively obese.  Incisions are clean.  Slight indention of extraction site incision   Musculoskeletal:         General: Normal range of motion.      Right lower leg: No edema.      Left lower leg: No edema.   Lymphadenopathy:      Cervical: No cervical adenopathy.   Skin:     General: Skin is warm and dry.      Findings: No erythema or rash.   Neurological:      Mental Status: She is oriented to person, place, and time.   Psychiatric:         Mood and Affect: Mood normal.         Behavior: Behavior normal.         Thought Content: Thought content normal.         Judgment: Judgment normal.          Significant Labs:  I have reviewed all pertinent lab results within the past 24 hours.  CBC:   Recent Labs   Lab 10/26/23  0505   WBC 14.04*   RBC 4.14   HGB 8.9*   HCT 31.1*      MCV 75*   MCH 21.5*   MCHC 28.6*     CMP:   Recent Labs   Lab 10/26/23  0505      CALCIUM 8.1*   ALBUMIN 2.9*   PROT 7.1      K 4.0   CO2 18*      BUN 10   CREATININE 1.4   ALKPHOS 59   ALT 13   AST 19   BILITOT 0.4       Significant Diagnostics:  I have reviewed all pertinent imaging results/findings within the past 24 hours.  No new    Assessment/Plan:     * Cholelithiasis with acute cholecystitis   Robotic cholecystectomy 1026  "  White cell decreasing   Liver function tests normal   Low-fat diet   Convert to Augmentin   Discharge per hospital Medicine with 5 days of Augmentin and a narcotic pain medicine.  Follow-up in 3 weeks with General surgery, office will schedule appointment.      Nausea and vomiting  Likely secondary to acute cholecystitis    Mitral valve disease  Management per hospital Medicine/Cardiology    Morbid obesity  Increases her risk of wound complications.      Patient should discuss weight loss options with her primary provider    Primary hypertension  Management per hospital Medicine    CHF (congestive heart failure)  Echocardiogram completed.      Transthoracic echo (TTE) complete    Height:  5' 3" (1.6 m)   Weight:  141.1 kg (311 lb)   Blood Pressure:  136/63    Date of Study:  10/24/23   Ordering Provider:  Tawanna Krishna MD   Clinical Indications:  CHF (congestive heart failure) [I50.9 (ICD-10-CM)]       Interpreting Physicians  Performing Staff    Radu Jung MD Tech:  Erika Garza [place]        Reason for Exam  Priority: STAT  Dx: CHF (congestive heart failure) [I50.9 (ICD-10-CM)]     View Images Vital Vitrea     Show images for Echo Saline Bubble? Yes  Summary         Left Ventricle: The left ventricle is normal in size. Normal wall thickness. There is concentric remodeling. Normal wall motion. There is normal systolic function with a visually estimated ejection fraction of 55 - 60%. There is normal diastolic function.    Right Ventricle: Normal right ventricular cavity size. Wall thickness is normal. Right ventricle wall motion  is normal. Systolic function is normal.    Tricuspid Valve: There is mild regurgitation with a centrally directed jet.    Pulmonary Artery: The estimated pulmonary artery systolic pressure is 22 mmHg.    IVC/SVC: Normal venous pressure at 3 mmHg.      Cardiology has seen the patient.      Patient has a low/moderate risk of a cardiac event        Vaibhav White, " MD  General Surgery  [facility] - Telemetry ([facility])

## 2023-10-30 LAB
FINAL PATHOLOGIC DIAGNOSIS: NORMAL
GROSS: NORMAL
Lab: NORMAL

## 2023-11-13 ENCOUNTER — OFFICE VISIT (OUTPATIENT)
Dept: SURGERY | Facility: CLINIC | Age: 43
End: 2023-11-13
Payer: MEDICAID

## 2023-11-13 VITALS
SYSTOLIC BLOOD PRESSURE: 143 MMHG | DIASTOLIC BLOOD PRESSURE: 90 MMHG | BODY MASS INDEX: 55.06 KG/M2 | HEART RATE: 93 BPM | WEIGHT: 293 LBS

## 2023-11-13 DIAGNOSIS — Z90.49 STATUS POST CHOLECYSTECTOMY: Primary | ICD-10-CM

## 2023-11-13 PROCEDURE — 4010F PR ACE/ARB THEARPY RXD/TAKEN: ICD-10-PCS | Mod: CPTII,,, | Performed by: SURGERY

## 2023-11-13 PROCEDURE — 3077F SYST BP >= 140 MM HG: CPT | Mod: CPTII,,, | Performed by: SURGERY

## 2023-11-13 PROCEDURE — 99213 OFFICE O/P EST LOW 20 MIN: CPT | Mod: PBBFAC | Performed by: SURGERY

## 2023-11-13 PROCEDURE — 1159F MED LIST DOCD IN RCRD: CPT | Mod: CPTII,,, | Performed by: SURGERY

## 2023-11-13 PROCEDURE — 99999 PR PBB SHADOW E&M-EST. PATIENT-LVL III: CPT | Mod: PBBFAC,,, | Performed by: SURGERY

## 2023-11-13 PROCEDURE — 3077F PR MOST RECENT SYSTOLIC BLOOD PRESSURE >= 140 MM HG: ICD-10-PCS | Mod: CPTII,,, | Performed by: SURGERY

## 2023-11-13 PROCEDURE — 99999 PR PBB SHADOW E&M-EST. PATIENT-LVL III: ICD-10-PCS | Mod: PBBFAC,,, | Performed by: SURGERY

## 2023-11-13 PROCEDURE — 4010F ACE/ARB THERAPY RXD/TAKEN: CPT | Mod: CPTII,,, | Performed by: SURGERY

## 2023-11-13 PROCEDURE — 99024 POSTOP FOLLOW-UP VISIT: CPT | Mod: ,,, | Performed by: SURGERY

## 2023-11-13 PROCEDURE — 99024 PR POST-OP FOLLOW-UP VISIT: ICD-10-PCS | Mod: ,,, | Performed by: SURGERY

## 2023-11-13 PROCEDURE — 3080F PR MOST RECENT DIASTOLIC BLOOD PRESSURE >= 90 MM HG: ICD-10-PCS | Mod: CPTII,,, | Performed by: SURGERY

## 2023-11-13 PROCEDURE — 1159F PR MEDICATION LIST DOCUMENTED IN MEDICAL RECORD: ICD-10-PCS | Mod: CPTII,,, | Performed by: SURGERY

## 2023-11-13 PROCEDURE — 3080F DIAST BP >= 90 MM HG: CPT | Mod: CPTII,,, | Performed by: SURGERY

## 2023-11-13 RX ORDER — GABAPENTIN 100 MG/1
100 CAPSULE ORAL 3 TIMES DAILY
Qty: 90 CAPSULE | Refills: 3 | Status: SHIPPED | OUTPATIENT
Start: 2023-11-13 | End: 2024-11-12

## 2023-11-13 NOTE — PATIENT INSTRUCTIONS
The burning pain may be due to the sutures used to close wall that we removed the gallbladder through.      The sutures usually dissolve over time and hopefully the pain a get better.    We can try a medication called Neurontin to see if that helps.  If it does help we can increase the dose over time.      We will plan to see you back in about a month

## 2023-11-13 NOTE — PROGRESS NOTES
Subjective:       Patient ID: Vanessa Trammell is a 43 y.o. female.    Chief Complaint: Post-op Evaluation (Cholecystectomy )    Patient returns after robotic assisted cholecystectomy for acute cholecystitis.  Her only issue she was some burning pain in the right lower aspect of her abdomen.  She was otherwise tolerating a diet.  The pain is somewhat disabling.  She describes it as a burning pain that is worse with any type of movement.  Review of Systems   Skin:         Burning pain of the abdominal wall     Objective:      Physical Exam  Vitals reviewed.   Constitutional:       Appearance: She is obese.   Eyes:      General: No scleral icterus.  Cardiovascular:      Rate and Rhythm: Normal rate and regular rhythm.   Pulmonary:      Effort: Pulmonary effort is normal.   Abdominal:      General: Bowel sounds are normal.      Palpations: Abdomen is soft.      Comments: Significantly obese.  All incisions are healing well.      There is a burning sensation of the right lower quadrant with touch   Neurological:      Mental Status: She is alert.         Final Pathologic Diagnosis 1. Gallbladder, cholecystectomy:     - Necrotizing cholecystitis and cholelithiasis.     - Negative for dysplasia and malignancy.     Assessment:    Patient has a burning sensation of her right lower quadrant abdominal wall after surgery.  It is possible that this is some nerve entrapment from the Vicryl sutures used to close the extraction site.  The sutures will dissolve over time  Plan:       Trial of Neurontin.      Follow-up in 4 weeks

## 2023-12-07 ENCOUNTER — TELEPHONE (OUTPATIENT)
Dept: SMOKING CESSATION | Facility: CLINIC | Age: 43
End: 2023-12-07
Payer: MEDICAID

## 2023-12-07 NOTE — TELEPHONE ENCOUNTER
Attempted to call patient to reschedule their smoking cessation appointment. Left a message with my contact information to reschedule the appointment. Natalie Wood [phone].

## 2023-12-11 ENCOUNTER — TELEPHONE (OUTPATIENT)
Dept: SURGERY | Facility: CLINIC | Age: 43
End: 2023-12-11
Payer: MEDICAID

## 2023-12-11 NOTE — TELEPHONE ENCOUNTER
----- Message from Mary Pichardo sent at 12/11/2023 11:33 AM CST -----  Contact: Vanessa Mendez is calling to speak to the nurse regarding her scheduled appointment, she is not feeling well and did not want to come in like that. Please give her a call to reschedule her post op appointment at [phone]    Thanks  LJ

## 2023-12-11 NOTE — TELEPHONE ENCOUNTER
Returned call to patient, number is a non working number at the moment, could not leave any messages

## 2024-06-13 NOTE — FIRST PROVIDER EVALUATION
Medical screening examination initiated.  I have conducted a focused provider triage encounter, findings are as follows:    Brief history of present illness:  ruq pain with n/v. Hx of gall stones    There were no vitals filed for this visit.    Pertinent physical exam:  uncomfortable     Brief workup plan:  labs, meds, further eval    Preliminary workup initiated; this workup will be continued and followed by the physician or advanced practice provider that is assigned to the patient when roomed.  
Opt out

## 2024-12-31 ENCOUNTER — TELEPHONE (OUTPATIENT)
Dept: SURGERY | Facility: CLINIC | Age: 44
End: 2024-12-31
Payer: MEDICAID

## 2024-12-31 NOTE — TELEPHONE ENCOUNTER
Left message to call back  ----- Message from Jake sent at 12/31/2024 10:08 AM CST -----  .Type: Patient Call Back        Who called:   Patient      What is the request in detail:    Called in concerning referral for cardiologist that was given when patient had surgery . Please call back  Can the clinic reply by [facility]?           Would the patient rather a call back or a response via [facility]?        Best call back number:  [phone]

## (undated) DEVICE — OBTURATOR BLDLESS 8MM LONG XI

## (undated) DEVICE — CLIP HEMO-LOK ML

## (undated) DEVICE — SEAL UNIVERSAL 5MM-8MM XI

## (undated) DEVICE — TOWEL OR DISP STRL BLUE 4/PK

## (undated) DEVICE — COVER TIP CURVED SCISSORS XI

## (undated) DEVICE — IRRIGATOR ENDOWRIST XI SUCTION

## (undated) DEVICE — SET PNEUMOCLEAR HEAT HUM SE HF

## (undated) DEVICE — SOL NORMAL USPCA 0.9%

## (undated) DEVICE — ELECTRODE REM PLYHSV RETURN 9

## (undated) DEVICE — SUT MONOCRYL 4.0 PS2 CP496G

## (undated) DEVICE — KIT ANTIFOG W/SPONG & FLUID

## (undated) DEVICE — GLOVE SURG BIOGEL LATEX SZ 7.5

## (undated) DEVICE — ADHESIVE DERMABOND ADVANCED

## (undated) DEVICE — SOL STRL WATER INJ 1000ML BG

## (undated) DEVICE — NDL HYPO SAFETY 25GX1.5IN

## (undated) DEVICE — DRAPE ARM DAVINCI XI

## (undated) DEVICE — OBTURATOR BLADELESS 8MM XI CLR

## (undated) DEVICE — DEVICE CLOSURE DISP 14G

## (undated) DEVICE — APPLICATOR CHLORAPREP ORN 26ML

## (undated) DEVICE — COVER LIGHT HANDLE 80/CA

## (undated) DEVICE — MANIFOLD 4 PORT

## (undated) DEVICE — PACK BASIC SETUP SC BR

## (undated) DEVICE — CLIPPER BLADE MOD 4406 (CAREF)

## (undated) DEVICE — BAG TISSUE RETRIEVAL 5MM

## (undated) DEVICE — NDL PNEUMO INSUFFLATI 120MM

## (undated) DEVICE — SUPPORT ULNA NERVE PROTECTOR

## (undated) DEVICE — DRAPE THREE-QTR REINF 53X77IN

## (undated) DEVICE — SYR 10CC LUER LOCK

## (undated) DEVICE — DRAPE ABDOMINAL TIBURON 14X11

## (undated) DEVICE — CLIP HEMO-LOK MLX LARGE LF

## (undated) DEVICE — GOWN POLY REINF BRTH SLV XL

## (undated) DEVICE — DRAPE COLUMN DAVINCI XI

## (undated) DEVICE — SUT CTD VICRYL 0 UND BR SUT